# Patient Record
Sex: MALE | Race: WHITE | NOT HISPANIC OR LATINO
[De-identification: names, ages, dates, MRNs, and addresses within clinical notes are randomized per-mention and may not be internally consistent; named-entity substitution may affect disease eponyms.]

---

## 2017-11-15 ENCOUNTER — APPOINTMENT (OUTPATIENT)
Dept: HEART AND VASCULAR | Facility: CLINIC | Age: 67
End: 2017-11-15
Payer: COMMERCIAL

## 2017-11-15 VITALS
BODY MASS INDEX: 32.51 KG/M2 | WEIGHT: 240 LBS | HEART RATE: 80 BPM | SYSTOLIC BLOOD PRESSURE: 130 MMHG | DIASTOLIC BLOOD PRESSURE: 80 MMHG | HEIGHT: 72 IN

## 2017-11-15 DIAGNOSIS — M50.90 CERVICAL DISC DISORDER, UNSPECIFIED, UNSPECIFIED CERVICAL REGION: ICD-10-CM

## 2017-11-15 PROCEDURE — 93000 ELECTROCARDIOGRAM COMPLETE: CPT

## 2017-11-15 PROCEDURE — 93306 TTE W/DOPPLER COMPLETE: CPT

## 2017-11-15 PROCEDURE — 99214 OFFICE O/P EST MOD 30 MIN: CPT | Mod: 25

## 2017-11-15 RX ORDER — AMOXICILLIN AND CLAVULANATE POTASSIUM 875; 125 MG/1; MG/1
875-125 TABLET, COATED ORAL
Qty: 20 | Refills: 0 | Status: DISCONTINUED | COMMUNITY
Start: 2017-08-31

## 2017-11-15 RX ORDER — AMOXICILLIN AND CLAVULANATE POTASSIUM 500; 125 MG/1; MG/1
500-125 TABLET, FILM COATED ORAL
Qty: 14 | Refills: 0 | Status: DISCONTINUED | COMMUNITY
Start: 2017-06-30

## 2018-05-16 ENCOUNTER — APPOINTMENT (OUTPATIENT)
Dept: HEART AND VASCULAR | Facility: CLINIC | Age: 68
End: 2018-05-16
Payer: COMMERCIAL

## 2018-05-16 VITALS
BODY MASS INDEX: 32.55 KG/M2 | SYSTOLIC BLOOD PRESSURE: 124 MMHG | DIASTOLIC BLOOD PRESSURE: 80 MMHG | WEIGHT: 240 LBS | HEART RATE: 72 BPM

## 2018-05-16 PROCEDURE — 93000 ELECTROCARDIOGRAM COMPLETE: CPT

## 2018-05-16 PROCEDURE — 99214 OFFICE O/P EST MOD 30 MIN: CPT | Mod: 25

## 2018-05-16 RX ORDER — OMEPRAZOLE 20 MG/1
20 CAPSULE, DELAYED RELEASE ORAL
Qty: 90 | Refills: 0 | Status: DISCONTINUED | COMMUNITY
Start: 2018-01-25

## 2018-05-16 RX ORDER — AZITHROMYCIN 250 MG/1
250 TABLET, FILM COATED ORAL
Qty: 6 | Refills: 0 | Status: DISCONTINUED | COMMUNITY
Start: 2018-02-28

## 2018-05-16 RX ORDER — METHIMAZOLE 5 MG/1
5 TABLET ORAL
Qty: 90 | Refills: 0 | Status: DISCONTINUED | COMMUNITY
Start: 2017-12-22

## 2018-05-16 RX ORDER — CIPROFLOXACIN AND DEXAMETHASONE 3; 1 MG/ML; MG/ML
0.3-0.1 SUSPENSION/ DROPS AURICULAR (OTIC)
Qty: 7 | Refills: 0 | Status: DISCONTINUED | COMMUNITY
Start: 2018-04-06

## 2018-05-16 RX ORDER — GUAIFENESIN AND CODEINE PHOSPHATE 10; 100 MG/5ML; MG/5ML
100-10 SOLUTION ORAL
Qty: 140 | Refills: 0 | Status: DISCONTINUED | COMMUNITY
Start: 2018-02-02

## 2018-05-16 RX ORDER — BENZONATATE 100 MG/1
100 CAPSULE ORAL
Qty: 21 | Refills: 0 | Status: DISCONTINUED | COMMUNITY
Start: 2018-02-06

## 2018-05-16 RX ORDER — OSELTAMIVIR PHOSPHATE 75 MG/1
75 CAPSULE ORAL
Qty: 10 | Refills: 0 | Status: DISCONTINUED | COMMUNITY
Start: 2018-01-30

## 2018-05-16 RX ORDER — ERGOCALCIFEROL 1.25 MG/1
1.25 MG CAPSULE, LIQUID FILLED ORAL
Qty: 3 | Refills: 0 | Status: ACTIVE | COMMUNITY
Start: 2017-05-09

## 2018-05-16 RX ORDER — PREDNISONE 20 MG/1
20 TABLET ORAL
Qty: 4 | Refills: 0 | Status: DISCONTINUED | COMMUNITY
Start: 2018-01-30

## 2018-11-14 ENCOUNTER — APPOINTMENT (OUTPATIENT)
Dept: HEART AND VASCULAR | Facility: CLINIC | Age: 68
End: 2018-11-14
Payer: COMMERCIAL

## 2018-11-14 VITALS
BODY MASS INDEX: 32.01 KG/M2 | SYSTOLIC BLOOD PRESSURE: 110 MMHG | DIASTOLIC BLOOD PRESSURE: 64 MMHG | WEIGHT: 236 LBS | HEART RATE: 60 BPM

## 2018-11-14 DIAGNOSIS — I34.0 NONRHEUMATIC MITRAL (VALVE) INSUFFICIENCY: ICD-10-CM

## 2018-11-14 PROCEDURE — 93000 ELECTROCARDIOGRAM COMPLETE: CPT

## 2018-11-14 PROCEDURE — 99214 OFFICE O/P EST MOD 30 MIN: CPT | Mod: 25

## 2018-11-14 PROCEDURE — 93306 TTE W/DOPPLER COMPLETE: CPT

## 2018-11-14 RX ORDER — SIMVASTATIN 80 MG/1
80 TABLET, FILM COATED ORAL
Qty: 90 | Refills: 0 | Status: DISCONTINUED | COMMUNITY
Start: 2017-06-06 | End: 2018-11-14

## 2018-11-14 NOTE — REVIEW OF SYSTEMS
[Fever] : no fever [Headache] : headache [Recent Weight Gain (___ Lbs)] : no recent weight gain [Chills] : no chills [Feeling Fatigued] : feeling fatigued [Recent Weight Loss (___ Lbs)] : no recent weight loss [Blurry Vision] : no blurred vision [Seeing Double (Diplopia)] : no diplopia [Eye Pain] : no eye pain [Eyeglasses] : currently wearing eyeglasses [Eyesight Problems] : eyesight problems [Dry Eyes] : dryness of the eyes [Loss Of Hearing] : no hearing loss [Sore Throat] : no sore throat [Pain During Urination] : no dysuria [Nocturia] : nocturia [see HPI] : see HPI [Joint Pain] : joint pain [Joint Swelling] : no joint swelling [Joint Stiffness] : no joint stiffness [Muscle Cramps] : no muscle cramps [Limb Weakness (Paresis)] : no limb weakness [Lower Back Pain] : lower back pain [Upper Back Pain] : upper back pain [Dizziness] : no dizziness [Tremor] : no tremor was seen [Numbness (Hypesthesia)] : numbness [Convulsions] : no convulsions [Tingling (Paresthesia)] : no tingling [Confusion] : no confusion was observed [Memory Lapses Or Loss] : no memory lapses or loss [Depression] : depression [Anxiety] : anxiety [Under Stress] : under stress [Suicidal] : not suicidal [Negative] : Heme/Lymph

## 2018-11-14 NOTE — PHYSICAL EXAM
[General Appearance - Well Developed] : well developed [Normal Appearance] : normal appearance [Well Groomed] : well groomed [General Appearance - Well Nourished] : well nourished [No Deformities] : no deformities [General Appearance - In No Acute Distress] : no acute distress [Normal Conjunctiva] : the conjunctiva exhibited no abnormalities [Normal Oral Mucosa] : normal oral mucosa [No Oral Pallor] : no oral pallor [No Oral Cyanosis] : no oral cyanosis [Normal Jugular Venous A Waves Present] : normal jugular venous A waves present [Normal Jugular Venous V Waves Present] : normal jugular venous V waves present [No Jugular Venous Dunham A Waves] : no jugular venous dunham A waves [FreeTextEntry1] : carotids w/o bruits [] : no respiratory distress [Respiration, Rhythm And Depth] : normal respiratory rhythm and effort [Exaggerated Use Of Accessory Muscles For Inspiration] : no accessory muscle use [Auscultation Breath Sounds / Voice Sounds] : lungs were clear to auscultation bilaterally [Heart Rate And Rhythm] : heart rate and rhythm were normal [Heart Sounds] : normal S1 and S2 [Murmurs] : no murmurs present [Arterial Pulses Normal] : the arterial pulses were normal [Edema] : no peripheral edema present [Bowel Sounds] : normal bowel sounds [Abdomen Soft] : soft [Abdomen Tenderness] : non-tender [Abnormal Walk] : normal gait [Nail Clubbing] : no clubbing of the fingernails [Skin Color & Pigmentation] : normal skin color and pigmentation [Oriented To Time, Place, And Person] : oriented to person, place, and time

## 2018-11-14 NOTE — DISCUSSION/SUMMARY
[FreeTextEntry1] : EKG:NSR,WNL\par ECHO:normal LVEF,min TR\par cont diovan for hptn; zocor for lipids.\par reveiwed labs done Sept 2018:\par AL=692om%,LDL=85mg%

## 2018-11-14 NOTE — HISTORY OF PRESENT ILLNESS
[FreeTextEntry1] : using TENS instead of narcotics for pain. was feeling fatigued and dx with EBV- no cp,sob

## 2018-12-31 ENCOUNTER — APPOINTMENT (OUTPATIENT)
Dept: ENDOCRINOLOGY | Facility: CLINIC | Age: 68
End: 2018-12-31

## 2019-04-17 ENCOUNTER — APPOINTMENT (OUTPATIENT)
Dept: HEART AND VASCULAR | Facility: CLINIC | Age: 69
End: 2019-04-17
Payer: COMMERCIAL

## 2019-04-17 VITALS
WEIGHT: 235 LBS | SYSTOLIC BLOOD PRESSURE: 114 MMHG | DIASTOLIC BLOOD PRESSURE: 78 MMHG | BODY MASS INDEX: 31.87 KG/M2 | HEART RATE: 68 BPM

## 2019-04-17 DIAGNOSIS — R07.89 OTHER CHEST PAIN: ICD-10-CM

## 2019-04-17 PROCEDURE — 93000 ELECTROCARDIOGRAM COMPLETE: CPT

## 2019-04-17 PROCEDURE — 99214 OFFICE O/P EST MOD 30 MIN: CPT | Mod: 25

## 2019-04-17 NOTE — DISCUSSION/SUMMARY
[FreeTextEntry1] : EKG:NSR\par Feel CP probably not ischemic but will get pharmacologic nuc stress ( can't exercise due to L/S issues)\par cont Diovan for HPTN, ,Zocor for lipids. reviewed labs done feb 26,2019; TC= 171mg%,LE=232ch%,LDL=97mg%,HDL=97mg%\par meds renewed

## 2019-04-17 NOTE — REVIEW OF SYSTEMS
[Fever] : no fever [Headache] : headache [Recent Weight Gain (___ Lbs)] : no recent weight gain [Feeling Fatigued] : not feeling fatigued [Chills] : no chills [Blurry Vision] : no blurred vision [Recent Weight Loss (___ Lbs)] : no recent weight loss [Eye Pain] : no eye pain [Seeing Double (Diplopia)] : no diplopia [Dry Eyes] : dryness of the eyes [Eyesight Problems] : eyesight problems [Eyeglasses] : currently wearing eyeglasses [Loss Of Hearing] : no hearing loss [Sore Throat] : no sore throat [Chest  Pressure] : chest pressure [Abdominal Pain] : no abdominal pain [Heartburn] : heartburn [Nocturia] : nocturia [Pain During Urination] : no dysuria [see HPI] : see HPI [Joint Swelling] : no joint swelling [Joint Pain] : joint pain [Joint Stiffness] : no joint stiffness [Limb Weakness (Paresis)] : no limb weakness [Lower Back Pain] : lower back pain [Muscle Cramps] : no muscle cramps [Dizziness] : no dizziness [Upper Back Pain] : upper back pain [Numbness (Hypesthesia)] : numbness [Convulsions] : no convulsions [Tremor] : no tremor was seen [Memory Lapses Or Loss] : no memory lapses or loss [Tingling (Paresthesia)] : no tingling [Confusion] : no confusion was observed [Anxiety] : anxiety [Depression] : depression [Suicidal] : not suicidal [Under Stress] : under stress [Negative] : Heme/Lymph

## 2019-04-17 NOTE — HISTORY OF PRESENT ILLNESS
[FreeTextEntry1] : has L/S Sx and due for another injection. has cp periodically, lasts a few seconds, non exertional. No sob,palpitations. states has thyroid bx scheduled next week\par Diovan HCT changed to Diovan because of elevated Uric acid

## 2019-04-17 NOTE — PHYSICAL EXAM
[Normal Appearance] : normal appearance [General Appearance - Well Developed] : well developed [Well Groomed] : well groomed [No Deformities] : no deformities [General Appearance - Well Nourished] : well nourished [General Appearance - In No Acute Distress] : no acute distress [Normal Conjunctiva] : the conjunctiva exhibited no abnormalities [Normal Oral Mucosa] : normal oral mucosa [Normal Jugular Venous A Waves Present] : normal jugular venous A waves present [Normal Jugular Venous V Waves Present] : normal jugular venous V waves present [No Jugular Venous Dunham A Waves] : no jugular venous dunham A waves [] : no respiratory distress [Respiration, Rhythm And Depth] : normal respiratory rhythm and effort [Heart Rate And Rhythm] : heart rate and rhythm were normal [Auscultation Breath Sounds / Voice Sounds] : lungs were clear to auscultation bilaterally [Exaggerated Use Of Accessory Muscles For Inspiration] : no accessory muscle use [Arterial Pulses Normal] : the arterial pulses were normal [Heart Sounds] : normal S1 and S2 [Murmurs] : no murmurs present [Edema] : no peripheral edema present [Bowel Sounds] : normal bowel sounds [Abdomen Tenderness] : non-tender [Abnormal Walk] : normal gait [Abdomen Soft] : soft [FreeTextEntry1] : decreased C/S ROM; Back pain [Nail Clubbing] : no clubbing of the fingernails [Skin Color & Pigmentation] : normal skin color and pigmentation [Oriented To Time, Place, And Person] : oriented to person, place, and time

## 2019-08-07 ENCOUNTER — APPOINTMENT (OUTPATIENT)
Dept: HEART AND VASCULAR | Facility: CLINIC | Age: 69
End: 2019-08-07

## 2019-09-04 ENCOUNTER — APPOINTMENT (OUTPATIENT)
Dept: HEART AND VASCULAR | Facility: CLINIC | Age: 69
End: 2019-09-04
Payer: COMMERCIAL

## 2019-09-04 VITALS
DIASTOLIC BLOOD PRESSURE: 80 MMHG | WEIGHT: 237 LBS | BODY MASS INDEX: 32.14 KG/M2 | HEART RATE: 84 BPM | SYSTOLIC BLOOD PRESSURE: 110 MMHG

## 2019-09-04 PROCEDURE — 93000 ELECTROCARDIOGRAM COMPLETE: CPT

## 2019-09-04 PROCEDURE — 99214 OFFICE O/P EST MOD 30 MIN: CPT | Mod: 25

## 2019-09-04 NOTE — REVIEW OF SYSTEMS
[Fever] : no fever [Headache] : no headache [Recent Weight Gain (___ Lbs)] : no recent weight gain [Chills] : no chills [Feeling Fatigued] : not feeling fatigued [Recent Weight Loss (___ Lbs)] : no recent weight loss [Blurry Vision] : no blurred vision [Seeing Double (Diplopia)] : no diplopia [Eye Pain] : no eye pain [Eyeglasses] : currently wearing eyeglasses [Eyesight Problems] : eyesight problems [Dry Eyes] : dryness of the eyes [Loss Of Hearing] : no hearing loss [Sore Throat] : no sore throat [Chest  Pressure] : no chest pressure [Abdominal Pain] : no abdominal pain [Heartburn] : no heartburn [Pain During Urination] : no dysuria [Nocturia] : nocturia [see HPI] : see HPI [Joint Pain] : joint pain [Joint Swelling] : no joint swelling [Joint Stiffness] : no joint stiffness [Muscle Cramps] : no muscle cramps [Limb Weakness (Paresis)] : no limb weakness [Lower Back Pain] : lower back pain [Upper Back Pain] : upper back pain [Dizziness] : no dizziness [Tremor] : no tremor was seen [Numbness (Hypesthesia)] : numbness [Convulsions] : no convulsions [Tingling (Paresthesia)] : no tingling [Confusion] : no confusion was observed [Memory Lapses Or Loss] : no memory lapses or loss [Depression] : depression [Anxiety] : anxiety [Under Stress] : not under stress [Suicidal] : not suicidal [Negative] : Heme/Lymph

## 2019-09-04 NOTE — DISCUSSION/SUMMARY
[FreeTextEntry1] : EKG:NSR,WNL\par had labs in May- reviewed, lipids not done\par weight loss\par cont Zocor for lipids, Diovan for hptn

## 2019-09-04 NOTE — PHYSICAL EXAM
[General Appearance - Well Developed] : well developed [Normal Appearance] : normal appearance [Well Groomed] : well groomed [General Appearance - Well Nourished] : well nourished [No Deformities] : no deformities [General Appearance - In No Acute Distress] : no acute distress [Normal Conjunctiva] : the conjunctiva exhibited no abnormalities [Normal Oral Mucosa] : normal oral mucosa [Normal Jugular Venous A Waves Present] : normal jugular venous A waves present [Normal Jugular Venous V Waves Present] : normal jugular venous V waves present [No Jugular Venous Dunham A Waves] : no jugular venous dunham A waves [FreeTextEntry1] : carotids w/o bruits [] : no respiratory distress [Respiration, Rhythm And Depth] : normal respiratory rhythm and effort [Exaggerated Use Of Accessory Muscles For Inspiration] : no accessory muscle use [Auscultation Breath Sounds / Voice Sounds] : lungs were clear to auscultation bilaterally [Heart Rate And Rhythm] : heart rate and rhythm were normal [Heart Sounds] : normal S1 and S2 [Murmurs] : no murmurs present [Edema] : no peripheral edema present [Arterial Pulses Normal] : the arterial pulses were normal [Bowel Sounds] : normal bowel sounds [Abdomen Soft] : soft [Abnormal Walk] : normal gait [Abdomen Tenderness] : non-tender [Nail Clubbing] : no clubbing of the fingernails [Skin Color & Pigmentation] : normal skin color and pigmentation [Oriented To Time, Place, And Person] : oriented to person, place, and time

## 2019-12-04 ENCOUNTER — APPOINTMENT (OUTPATIENT)
Dept: HEART AND VASCULAR | Facility: CLINIC | Age: 69
End: 2019-12-04
Payer: COMMERCIAL

## 2019-12-04 VITALS
BODY MASS INDEX: 31.19 KG/M2 | DIASTOLIC BLOOD PRESSURE: 76 MMHG | HEART RATE: 64 BPM | SYSTOLIC BLOOD PRESSURE: 122 MMHG | WEIGHT: 230 LBS

## 2019-12-04 DIAGNOSIS — I36.1 NONRHEUMATIC TRICUSPID (VALVE) INSUFFICIENCY: ICD-10-CM

## 2019-12-04 PROCEDURE — 99214 OFFICE O/P EST MOD 30 MIN: CPT | Mod: 25

## 2019-12-04 PROCEDURE — 93306 TTE W/DOPPLER COMPLETE: CPT

## 2019-12-04 PROCEDURE — 93000 ELECTROCARDIOGRAM COMPLETE: CPT

## 2019-12-04 NOTE — DISCUSSION/SUMMARY
[FreeTextEntry1] : EKG:NSR,WNL\par ECHO:LVH,normal LVEF,trivial AR/TR\par reviewed labs done 10/9/2019:\par OH=266cb%,LB=552rx%,HDL=45mg%,zUO=701lw%\par would increase Zocor for better LDL lowering; cont valsartan for HPTN\par f/u labs with PCP

## 2019-12-04 NOTE — REVIEW OF SYSTEMS
[Fever] : no fever [Recent Weight Gain (___ Lbs)] : no recent weight gain [Headache] : no headache [Chills] : no chills [Feeling Fatigued] : not feeling fatigued [Blurry Vision] : no blurred vision [Recent Weight Loss (___ Lbs)] : recent [unfilled] ~Ulb weight loss [Eyeglasses] : currently wearing eyeglasses [Seeing Double (Diplopia)] : no diplopia [Eye Pain] : no eye pain [Dry Eyes] : dryness of the eyes [Eyesight Problems] : eyesight problems [Loss Of Hearing] : no hearing loss [Sore Throat] : no sore throat [Abdominal Pain] : no abdominal pain [Chest  Pressure] : no chest pressure [Pain During Urination] : no dysuria [Heartburn] : no heartburn [Nocturia] : nocturia [see HPI] : see HPI [Joint Swelling] : no joint swelling [Joint Pain] : joint pain [Joint Stiffness] : no joint stiffness [Muscle Cramps] : no muscle cramps [Limb Weakness (Paresis)] : no limb weakness [Lower Back Pain] : lower back pain [Upper Back Pain] : upper back pain [Dizziness] : no dizziness [Tremor] : a tremor was seen [Convulsions] : no convulsions [Tingling (Paresthesia)] : no tingling [Numbness (Hypesthesia)] : numbness [Memory Lapses Or Loss] : no memory lapses or loss [Confusion] : no confusion was observed [Depression] : depression [Anxiety] : anxiety [Under Stress] : not under stress [Suicidal] : not suicidal [Negative] : Endocrine

## 2019-12-04 NOTE — PHYSICAL EXAM
[General Appearance - Well Developed] : well developed [Well Groomed] : well groomed [Normal Appearance] : normal appearance [General Appearance - Well Nourished] : well nourished [No Deformities] : no deformities [General Appearance - In No Acute Distress] : no acute distress [Normal Conjunctiva] : the conjunctiva exhibited no abnormalities [Normal Oral Mucosa] : normal oral mucosa [Normal Jugular Venous A Waves Present] : normal jugular venous A waves present [Normal Jugular Venous V Waves Present] : normal jugular venous V waves present [No Jugular Venous Dunham A Waves] : no jugular venous dunham A waves [] : no respiratory distress [FreeTextEntry1] : carotids w/o bruits [Exaggerated Use Of Accessory Muscles For Inspiration] : no accessory muscle use [Respiration, Rhythm And Depth] : normal respiratory rhythm and effort [Auscultation Breath Sounds / Voice Sounds] : lungs were clear to auscultation bilaterally [Heart Rate And Rhythm] : heart rate and rhythm were normal [Murmurs] : no murmurs present [Heart Sounds] : normal S1 and S2 [Arterial Pulses Normal] : the arterial pulses were normal [Edema] : no peripheral edema present [Abdomen Soft] : soft [Bowel Sounds] : normal bowel sounds [Abdomen Tenderness] : non-tender [Abnormal Walk] : normal gait [Skin Color & Pigmentation] : normal skin color and pigmentation [Nail Clubbing] : no clubbing of the fingernails [Oriented To Time, Place, And Person] : oriented to person, place, and time

## 2020-06-01 ENCOUNTER — RX RENEWAL (OUTPATIENT)
Age: 70
End: 2020-06-01

## 2020-06-05 ENCOUNTER — APPOINTMENT (OUTPATIENT)
Dept: HEART AND VASCULAR | Facility: CLINIC | Age: 70
End: 2020-06-05
Payer: COMMERCIAL

## 2020-06-05 VITALS
DIASTOLIC BLOOD PRESSURE: 70 MMHG | BODY MASS INDEX: 31.87 KG/M2 | HEART RATE: 72 BPM | SYSTOLIC BLOOD PRESSURE: 130 MMHG | WEIGHT: 235 LBS

## 2020-06-05 PROCEDURE — 99214 OFFICE O/P EST MOD 30 MIN: CPT | Mod: 25

## 2020-06-05 PROCEDURE — 93000 ELECTROCARDIOGRAM COMPLETE: CPT

## 2020-06-05 NOTE — DISCUSSION/SUMMARY
[FreeTextEntry1] : EKG:NSR\par reviewed meds done Feb:\par PD=500um%,LDL=82mg%\par cont Diovan for hptn;Zocor for lipids

## 2020-06-05 NOTE — PHYSICAL EXAM
[General Appearance - Well Developed] : well developed [Normal Appearance] : normal appearance [Well Groomed] : well groomed [General Appearance - Well Nourished] : well nourished [No Deformities] : no deformities [General Appearance - In No Acute Distress] : no acute distress [Normal Conjunctiva] : the conjunctiva exhibited no abnormalities [Normal Oral Mucosa] : normal oral mucosa [Normal Jugular Venous A Waves Present] : normal jugular venous A waves present [Normal Jugular Venous V Waves Present] : normal jugular venous V waves present [FreeTextEntry1] : carotids w/o bruits [No Jugular Venous Dunham A Waves] : no jugular venous dunham A waves [] : no respiratory distress [Respiration, Rhythm And Depth] : normal respiratory rhythm and effort [Exaggerated Use Of Accessory Muscles For Inspiration] : no accessory muscle use [Auscultation Breath Sounds / Voice Sounds] : lungs were clear to auscultation bilaterally [Heart Sounds] : normal S1 and S2 [Heart Rate And Rhythm] : heart rate and rhythm were normal [Murmurs] : no murmurs present [Arterial Pulses Normal] : the arterial pulses were normal [Edema] : no peripheral edema present [Bowel Sounds] : normal bowel sounds [Abdomen Soft] : soft [Abdomen Tenderness] : non-tender [Abnormal Walk] : normal gait [Nail Clubbing] : no clubbing of the fingernails [Skin Color & Pigmentation] : normal skin color and pigmentation [Oriented To Time, Place, And Person] : oriented to person, place, and time

## 2020-07-30 ENCOUNTER — APPOINTMENT (OUTPATIENT)
Dept: ENDOCRINOLOGY | Facility: CLINIC | Age: 70
End: 2020-07-30
Payer: COMMERCIAL

## 2020-07-30 VITALS
BODY MASS INDEX: 34.02 KG/M2 | WEIGHT: 235 LBS | DIASTOLIC BLOOD PRESSURE: 87 MMHG | OXYGEN SATURATION: 96 % | HEIGHT: 69.5 IN | HEART RATE: 82 BPM | SYSTOLIC BLOOD PRESSURE: 154 MMHG

## 2020-07-30 DIAGNOSIS — G25.81 RESTLESS LEGS SYNDROME: ICD-10-CM

## 2020-07-30 DIAGNOSIS — Z82.49 FAMILY HISTORY OF ISCHEMIC HEART DISEASE AND OTHER DISEASES OF THE CIRCULATORY SYSTEM: ICD-10-CM

## 2020-07-30 DIAGNOSIS — Z86.39 PERSONAL HISTORY OF OTHER ENDOCRINE, NUTRITIONAL AND METABOLIC DISEASE: ICD-10-CM

## 2020-07-30 DIAGNOSIS — Z80.9 FAMILY HISTORY OF MALIGNANT NEOPLASM, UNSPECIFIED: ICD-10-CM

## 2020-07-30 DIAGNOSIS — R51 HEADACHE: ICD-10-CM

## 2020-07-30 DIAGNOSIS — Z86.79 PERSONAL HISTORY OF OTHER DISEASES OF THE CIRCULATORY SYSTEM: ICD-10-CM

## 2020-07-30 DIAGNOSIS — E07.9 DISORDER OF THYROID, UNSPECIFIED: ICD-10-CM

## 2020-07-30 PROCEDURE — 36415 COLL VENOUS BLD VENIPUNCTURE: CPT

## 2020-07-30 PROCEDURE — 99243 OFF/OP CNSLTJ NEW/EST LOW 30: CPT | Mod: 25

## 2020-07-30 RX ORDER — ALPRAZOLAM 0.5 MG/1
0.5 TABLET ORAL
Qty: 60 | Refills: 0 | Status: DISCONTINUED | COMMUNITY
Start: 2017-08-08 | End: 2020-07-30

## 2020-07-30 RX ORDER — ALPRAZOLAM 0.5 MG/1
0.5 TABLET ORAL
Refills: 0 | Status: ACTIVE | COMMUNITY

## 2020-07-30 RX ORDER — CITALOPRAM HYDROBROMIDE 20 MG/1
20 TABLET, FILM COATED ORAL
Qty: 90 | Refills: 0 | Status: DISCONTINUED | COMMUNITY
Start: 2017-10-09 | End: 2020-07-30

## 2020-07-30 RX ORDER — GABAPENTIN 900 MG/1
TABLET, FILM COATED ORAL
Refills: 0 | Status: DISCONTINUED | COMMUNITY
End: 2020-07-30

## 2020-07-30 RX ORDER — GABAPENTIN 600 MG/1
600 TABLET, COATED ORAL
Refills: 0 | Status: ACTIVE | COMMUNITY

## 2020-07-30 RX ORDER — CYCLOSPORINE 0.5 MG/ML
0.05 EMULSION OPHTHALMIC
Qty: 180 | Refills: 0 | Status: DISCONTINUED | COMMUNITY
Start: 2016-09-12 | End: 2020-07-30

## 2020-07-30 RX ORDER — CHLORZOXAZONE 750 MG/1
TABLET ORAL
Refills: 0 | Status: DISCONTINUED | COMMUNITY
End: 2020-07-30

## 2020-07-30 NOTE — PHYSICAL EXAM
[Well Nourished] : well nourished [Alert] : alert [No Acute Distress] : no acute distress [Well Developed] : well developed [EOMI] : extra ocular movement intact [Normal Sclera/Conjunctiva] : normal sclera/conjunctiva [Normal Oropharynx] : the oropharynx was normal [No Proptosis] : no proptosis [No Accessory Muscle Use] : no accessory muscle use [Well Healed Scar] : well healed scar [No Respiratory Distress] : no respiratory distress [Clear to Auscultation] : lungs were clear to auscultation bilaterally [Normal S1, S2] : normal S1 and S2 [No Edema] : no peripheral edema [Normal Rate] : heart rate was normal [Regular Rhythm] : with a regular rhythm [Normal Bowel Sounds] : normal bowel sounds [Pedal Pulses Normal] : the pedal pulses are present [Soft] : abdomen soft [Not Tender] : non-tender [Not Distended] : not distended [Normal Anterior Cervical Nodes] : no anterior cervical lymphadenopathy [No Spinal Tenderness] : no spinal tenderness [Normal Posterior Cervical Nodes] : no posterior cervical lymphadenopathy [Spine Straight] : spine straight [Normal Gait] : normal gait [No Stigmata of Cushings Syndrome] : no stigmata of Cushings Syndrome [No Rash] : no rash [Normal Strength/Tone] : muscle strength and tone were normal [Acanthosis Nigricans] : no acanthosis nigricans [Normal Reflexes] : deep tendon reflexes were 2+ and symmetric [No Tremors] : no tremors [Oriented x3] : oriented to person, place, and time [de-identified] : Right enlarged around 5 CM in size non-tender

## 2020-07-30 NOTE — CONSULT LETTER
[Dear  ___] : Dear  [unfilled], [( Thank you for referring [unfilled] for consultation for _____ )] : Thank you for referring [unfilled] for consultation for [unfilled] [Please see my note below.] : Please see my note below. [Consult Closing:] : Thank you very much for allowing me to participate in the care of this patient.  If you have any questions, please do not hesitate to contact me. [Sincerely,] : Sincerely, [FreeTextEntry3] : Katalina Masterson MD\par

## 2020-07-30 NOTE — REVIEW OF SYSTEMS
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

## 2020-07-30 NOTE — HISTORY OF PRESENT ILLNESS
[FreeTextEntry1] : Patient is a 71 y/o man who was found to have an enlarged thyroid last year. He was noting intermittent pain on the right side of his neck. A sonogram done 4/2019 shows multiple nodules with largest on right being 5 cm; had FNA that was [place] 2. Denies hoarseness or difficulty swallowing. He has no prior h/o of thyroid problems.

## 2020-07-30 NOTE — REASON FOR VISIT
[Consultation] : a consultation visit [Thyroid nodule/ MNG] : thyroid nodule/ MNG [FreeTextEntry2] : Dr Olivares

## 2020-09-02 ENCOUNTER — NON-APPOINTMENT (OUTPATIENT)
Age: 70
End: 2020-09-02

## 2020-09-03 ENCOUNTER — APPOINTMENT (OUTPATIENT)
Dept: ENDOCRINOLOGY | Facility: CLINIC | Age: 70
End: 2020-09-03
Payer: COMMERCIAL

## 2020-09-03 VITALS
BODY MASS INDEX: 34.02 KG/M2 | SYSTOLIC BLOOD PRESSURE: 150 MMHG | HEIGHT: 69.5 IN | DIASTOLIC BLOOD PRESSURE: 83 MMHG | OXYGEN SATURATION: 93 % | WEIGHT: 235 LBS | HEART RATE: 77 BPM

## 2020-09-03 LAB
CALCIT SERPL-MCNC: 13.6 PG/ML
SAVE SPECIMEN: NORMAL
T3 SERPL-MCNC: 95 NG/DL
T4 FREE SERPL-MCNC: 1 NG/DL
T4 SERPL-MCNC: 6.5 UG/DL
THYROGLOB AB SERPL-ACNC: <20 IU/ML
THYROPEROXIDASE AB SERPL IA-ACNC: 19.8 IU/ML
TSH RECEPTOR AB: <1.1 IU/L
TSH SERPL-ACNC: 0.18 UIU/ML
TSI ACT/NOR SER: <0.1 IU/L

## 2020-09-03 PROCEDURE — 36415 COLL VENOUS BLD VENIPUNCTURE: CPT

## 2020-09-03 PROCEDURE — 99213 OFFICE O/P EST LOW 20 MIN: CPT | Mod: 25

## 2020-09-03 NOTE — PHYSICAL EXAM
[Alert] : alert [Well Nourished] : well nourished [No Acute Distress] : no acute distress [Well Developed] : well developed [Normal Sclera/Conjunctiva] : normal sclera/conjunctiva [EOMI] : extra ocular movement intact [No Proptosis] : no proptosis [Normal Oropharynx] : the oropharynx was normal [Well Healed Scar] : well healed scar [No Respiratory Distress] : no respiratory distress [No Accessory Muscle Use] : no accessory muscle use [Clear to Auscultation] : lungs were clear to auscultation bilaterally [Normal S1, S2] : normal S1 and S2 [Normal Rate] : heart rate was normal [Regular Rhythm] : with a regular rhythm [No Edema] : no peripheral edema [Pedal Pulses Normal] : the pedal pulses are present [Normal Bowel Sounds] : normal bowel sounds [Not Tender] : non-tender [Not Distended] : not distended [Soft] : abdomen soft [No Spinal Tenderness] : no spinal tenderness [Normal Anterior Cervical Nodes] : no anterior cervical lymphadenopathy [Spine Straight] : spine straight [No Stigmata of Cushings Syndrome] : no stigmata of Cushings Syndrome [Normal Gait] : normal gait [Normal Strength/Tone] : muscle strength and tone were normal [No Rash] : no rash [Normal Reflexes] : deep tendon reflexes were 2+ and symmetric [No Tremors] : no tremors [Oriented x3] : oriented to person, place, and time [Acanthosis Nigricans] : no acanthosis nigricans [de-identified] : Right enlarged around 5 CM in size non-tender

## 2020-09-03 NOTE — ASSESSMENT
[FreeTextEntry1] : Patient with a MNG and recent labs shows a mildly suppressed TSH, repeated labs if same will do thyroid uptake and scan to r/o hot nodule.  His dominant nodule is quite large and with some pressure symptoms surgery can be considered. labs today check testosterone FSH/LH denies ED.

## 2020-09-03 NOTE — HISTORY OF PRESENT ILLNESS
[FreeTextEntry1] : Patient is a 71 y/o man who was found to have an enlarged thyroid last year. He was noting intermittent pain on the right side of his neck. A sonogram done 4/2019 shows multiple nodules with largest on right being 5 cm; had FNA that was [place] 2. Denies hoarseness or difficulty swallowing. He has no prior h/o of thyroid problems. TSH has been suppressed with normal T4 and T#, antibodies neg.

## 2020-09-10 LAB
CORTIS SERPL-MCNC: 8.9 UG/DL
FSH SERPL-MCNC: 9.8 IU/L
LH SERPL-ACNC: 4.7 IU/L
PROLACTIN SERPL-MCNC: 6.7 NG/ML
T3 SERPL-MCNC: 112 NG/DL
T4 FREE SERPL-MCNC: 1.2 NG/DL
T4 SERPL-MCNC: 6.8 UG/DL
TESTOST BND SERPL-MCNC: 3.8 PG/ML
TESTOST SERPL-MCNC: 445 NG/DL
TSH SERPL-ACNC: 0.17 UIU/ML

## 2020-09-23 ENCOUNTER — APPOINTMENT (OUTPATIENT)
Dept: NUCLEAR MEDICINE | Facility: HOSPITAL | Age: 70
End: 2020-09-23

## 2020-09-23 ENCOUNTER — RESULT REVIEW (OUTPATIENT)
Age: 70
End: 2020-09-23

## 2020-09-23 ENCOUNTER — OUTPATIENT (OUTPATIENT)
Dept: OUTPATIENT SERVICES | Facility: HOSPITAL | Age: 70
LOS: 1 days | End: 2020-09-23
Payer: COMMERCIAL

## 2020-09-23 DIAGNOSIS — E05.90 THYROTOXICOSIS, UNSPECIFIED WITHOUT THYROTOXIC CRISIS OR STORM: ICD-10-CM

## 2020-09-24 ENCOUNTER — APPOINTMENT (OUTPATIENT)
Dept: NUCLEAR MEDICINE | Facility: HOSPITAL | Age: 70
End: 2020-09-24

## 2020-09-24 ENCOUNTER — TRANSCRIPTION ENCOUNTER (OUTPATIENT)
Age: 70
End: 2020-09-24

## 2020-09-24 PROCEDURE — 78014 THYROID IMAGING W/BLOOD FLOW: CPT

## 2020-09-24 PROCEDURE — A9516: CPT

## 2020-10-01 ENCOUNTER — APPOINTMENT (OUTPATIENT)
Dept: ENDOCRINOLOGY | Facility: CLINIC | Age: 70
End: 2020-10-01
Payer: COMMERCIAL

## 2020-10-01 VITALS
OXYGEN SATURATION: 96 % | WEIGHT: 235 LBS | BODY MASS INDEX: 34.02 KG/M2 | SYSTOLIC BLOOD PRESSURE: 162 MMHG | HEART RATE: 79 BPM | DIASTOLIC BLOOD PRESSURE: 101 MMHG | HEIGHT: 69.5 IN

## 2020-10-01 VITALS — DIASTOLIC BLOOD PRESSURE: 70 MMHG | SYSTOLIC BLOOD PRESSURE: 118 MMHG

## 2020-10-01 PROCEDURE — 99213 OFFICE O/P EST LOW 20 MIN: CPT

## 2020-10-01 NOTE — HISTORY OF PRESENT ILLNESS
[FreeTextEntry1] : Patient is a 71 y/o man who was found to have an enlarged thyroid last year. He was noting intermittent pain on the right side of his neck. A sonogram done 4/2019 shows multiple nodules with largest on right being 5 cm; had FNA that was [place] 2. Denies hoarseness or difficulty swallowing. He has no prior h/o of thyroid problems. TSH has been suppressed with normal T4 and T3, antibodies neg. Recently did a thyroid uptake and scan.

## 2020-10-01 NOTE — PHYSICAL EXAM
[Alert] : alert [Well Nourished] : well nourished [No Acute Distress] : no acute distress [Well Developed] : well developed [Normal Sclera/Conjunctiva] : normal sclera/conjunctiva [EOMI] : extra ocular movement intact [No Proptosis] : no proptosis [Normal Oropharynx] : the oropharynx was normal [Well Healed Scar] : well healed scar [No Respiratory Distress] : no respiratory distress [No Accessory Muscle Use] : no accessory muscle use [Clear to Auscultation] : lungs were clear to auscultation bilaterally [Normal S1, S2] : normal S1 and S2 [Normal Rate] : heart rate was normal [Regular Rhythm] : with a regular rhythm [No Edema] : no peripheral edema [Pedal Pulses Normal] : the pedal pulses are present [Normal Bowel Sounds] : normal bowel sounds [Not Tender] : non-tender [Not Distended] : not distended [Soft] : abdomen soft [Normal Anterior Cervical Nodes] : no anterior cervical lymphadenopathy [No Spinal Tenderness] : no spinal tenderness [Spine Straight] : spine straight [No Stigmata of Cushings Syndrome] : no stigmata of Cushings Syndrome [Normal Gait] : normal gait [Normal Strength/Tone] : muscle strength and tone were normal [No Rash] : no rash [Acanthosis Nigricans] : no acanthosis nigricans [Normal Reflexes] : deep tendon reflexes were 2+ and symmetric [No Tremors] : no tremors [Oriented x3] : oriented to person, place, and time [de-identified] : Right enlarged around 5 CM in size slight discomfort

## 2020-10-01 NOTE — ASSESSMENT
[FreeTextEntry1] : Patient with a MNG and recent labs shows a mildly suppressed TSH, repeated labs the same .Thyroid uptake and scan shows heterogenous gland R>L to r/o  no hot nodule; uptake 24 %. .  His dominant nodule is quite large and with some pressure symptoms surgery can be considered. Will Consult with H and Neck surgeon.\par f/u 2 months to monitor TFTs will call if symptoms change. \par Repeat /70.

## 2020-11-03 ENCOUNTER — APPOINTMENT (OUTPATIENT)
Dept: OTOLARYNGOLOGY | Facility: CLINIC | Age: 70
End: 2020-11-03
Payer: COMMERCIAL

## 2020-11-03 VITALS
HEART RATE: 73 BPM | BODY MASS INDEX: 34.02 KG/M2 | HEIGHT: 69.5 IN | WEIGHT: 235 LBS | DIASTOLIC BLOOD PRESSURE: 73 MMHG | SYSTOLIC BLOOD PRESSURE: 117 MMHG

## 2020-11-03 PROCEDURE — 99204 OFFICE O/P NEW MOD 45 MIN: CPT | Mod: 25

## 2020-11-03 PROCEDURE — 31575 DIAGNOSTIC LARYNGOSCOPY: CPT

## 2020-11-03 PROCEDURE — 99072 ADDL SUPL MATRL&STAF TM PHE: CPT

## 2020-11-03 NOTE — PROCEDURE
[Dysphagia] : dysphagia not clearly evaluated by indirect laryngoscopy [None] : none [Flexible Endoscope] : examined with the flexible endoscope [Normal] : the nasopharynx was normal [de-identified] : c LAMINE.  nl bilat TVF motion

## 2020-11-03 NOTE — PHYSICAL EXAM
[Enlarged] : enlarged [Midline] : trachea located in midline position [Normal] : no rashes [FreeTextEntry1] : pt w R greater than L multinod goiter.  Pt w c/o neck spasm and pain on moving neck slightly for the fiberoptic eval.  pt w R ant and post neck scars related to prior c spine surgeries

## 2020-11-03 NOTE — HISTORY OF PRESENT ILLNESS
[de-identified] : Mr. Bay is a 69 yo male\par Referred by Dr. Masterson for multinodular thyroid.\par 4/4/2020 Sonogram showing multinodular nodule thyroid, isthmus with 3.9 cm solid isoechoic nodule\par Right mid to lower pole largest nodule measuring 5.0 cm solid isoechoic. Left lower pole 2 contiguous nodules, isoechoic measuring 2.9 cm\par 4/25/2020 FNA mid right lobe nodule.patho Bethesta category II .\par 8/21/2020 sonogram showing enlarged thyroid with stable large nodules\par 9/24/2020 Abnormal NM Thyroid imaging. compatible with multinodular goiter. Thyroid uptake of 24%\par Pt is subclinicaly hyperthyroid w sl depressed TSH. \par Pt notes occ swallow difficulty.   Pt does not lay flat due to c spine issues and fusion C#-7.  Had post approach in 2015 and R ant approac 1995.\par \par 8/21 study stable rel to 4/19.\par

## 2020-11-03 NOTE — CONSULT LETTER
[Dear  ___] : Dear  [unfilled], [Consult Letter:] : I had the pleasure of evaluating your patient, [unfilled]. [Please see my note below.] : Please see my note below. [Consult Closing:] : Thank you very much for allowing me to participate in the care of this patient.  If you have any questions, please do not hesitate to contact me. [Sincerely,] : Sincerely, [FreeTextEntry2] : Katalina Masterson MD ( [place], [place]) [FreeTextEntry3] : Donte Cabrera MD

## 2020-12-08 ENCOUNTER — APPOINTMENT (OUTPATIENT)
Dept: ENDOCRINOLOGY | Facility: CLINIC | Age: 70
End: 2020-12-08
Payer: COMMERCIAL

## 2020-12-08 VITALS
DIASTOLIC BLOOD PRESSURE: 83 MMHG | OXYGEN SATURATION: 95 % | BODY MASS INDEX: 34.17 KG/M2 | SYSTOLIC BLOOD PRESSURE: 120 MMHG | TEMPERATURE: 96.6 F | HEART RATE: 66 BPM | HEIGHT: 69.5 IN | WEIGHT: 236 LBS

## 2020-12-08 PROCEDURE — 99072 ADDL SUPL MATRL&STAF TM PHE: CPT

## 2020-12-08 PROCEDURE — 99213 OFFICE O/P EST LOW 20 MIN: CPT

## 2020-12-08 NOTE — ASSESSMENT
[FreeTextEntry1] : Patient with a MNG and recent labs shows a mildly suppressed TSH, repeated labs the same .Thyroid uptake and scan shows heterogenous gland R>L to r/o  no hot nodule; uptake 24 %. .  His dominant nodule is quite large and with some pressure symptoms however  surgery  felt to be risky due to spine problems.\par Will consider ASTUDILLO but in meantime start MTM 10 mg OD, side effects discussed. f/u 6 weeks.  [Methimazole Therapy/PTU Therapy] : Risks and benefits of methimazole therapy/PTU therapy were discussed with the patient, including rash, liver dysfunction, and agranulocytosis. Patient was instructed to call the office for flu-like symptoms (e.g. fever and sore-throat)

## 2020-12-08 NOTE — PHYSICAL EXAM
[Alert] : alert [Well Nourished] : well nourished [No Acute Distress] : no acute distress [Well Developed] : well developed [Normal Sclera/Conjunctiva] : normal sclera/conjunctiva [EOMI] : extra ocular movement intact [No Proptosis] : no proptosis [Normal Oropharynx] : the oropharynx was normal [Well Healed Scar] : well healed scar [No Respiratory Distress] : no respiratory distress [No Accessory Muscle Use] : no accessory muscle use [Clear to Auscultation] : lungs were clear to auscultation bilaterally [Normal S1, S2] : normal S1 and S2 [Normal Rate] : heart rate was normal [Regular Rhythm] : with a regular rhythm [No Edema] : no peripheral edema [Pedal Pulses Normal] : the pedal pulses are present [Normal Bowel Sounds] : normal bowel sounds [Not Tender] : non-tender [Not Distended] : not distended [Soft] : abdomen soft [Normal Anterior Cervical Nodes] : no anterior cervical lymphadenopathy [No Spinal Tenderness] : no spinal tenderness [Spine Straight] : spine straight [No Stigmata of Cushings Syndrome] : no stigmata of Cushings Syndrome [Normal Gait] : normal gait [Normal Strength/Tone] : muscle strength and tone were normal [No Rash] : no rash [Acanthosis Nigricans] : no acanthosis nigricans [Normal Reflexes] : deep tendon reflexes were 2+ and symmetric [No Tremors] : no tremors [Oriented x3] : oriented to person, place, and time [de-identified] : Right enlarged around 5 CM in size slight discomfort

## 2020-12-08 NOTE — HISTORY OF PRESENT ILLNESS
[FreeTextEntry1] : Patient is a 69 y/o man who was found to have an enlarged thyroid last year. He was noting intermittent pain on the right side of his neck. A sonogram done 4/2019 shows multiple nodules with largest on right being 5 cm; had FNA that was [place] 2. Denies hoarseness or difficulty swallowing. He has no prior h/o of thyroid problems. TSH has been suppressed with normal T4 and T3, antibodies neg. Recently did a thyroid uptake and scan RAIU 24 %. he did meet with the surgeon who felt given his cervical spine situation surgery may be risky in terms of positioning.. . he does note palpitations at times and difficulty sleeping and fatigue.

## 2020-12-17 ENCOUNTER — APPOINTMENT (OUTPATIENT)
Dept: HEART AND VASCULAR | Facility: CLINIC | Age: 70
End: 2020-12-17

## 2021-01-20 ENCOUNTER — APPOINTMENT (OUTPATIENT)
Dept: HEART AND VASCULAR | Facility: CLINIC | Age: 71
End: 2021-01-20
Payer: COMMERCIAL

## 2021-01-20 VITALS
BODY MASS INDEX: 33.48 KG/M2 | HEART RATE: 60 BPM | DIASTOLIC BLOOD PRESSURE: 82 MMHG | SYSTOLIC BLOOD PRESSURE: 122 MMHG | WEIGHT: 230 LBS

## 2021-01-20 DIAGNOSIS — I51.7 CARDIOMEGALY: ICD-10-CM

## 2021-01-20 DIAGNOSIS — I35.1 NONRHEUMATIC AORTIC (VALVE) INSUFFICIENCY: ICD-10-CM

## 2021-01-20 PROCEDURE — 99072 ADDL SUPL MATRL&STAF TM PHE: CPT

## 2021-01-20 PROCEDURE — 93000 ELECTROCARDIOGRAM COMPLETE: CPT

## 2021-01-20 PROCEDURE — 99214 OFFICE O/P EST MOD 30 MIN: CPT | Mod: 25

## 2021-01-20 PROCEDURE — 93306 TTE W/DOPPLER COMPLETE: CPT

## 2021-01-20 NOTE — PHYSICAL EXAM
[General Appearance - Well Developed] : well developed [Normal Appearance] : normal appearance [Well Groomed] : well groomed [General Appearance - Well Nourished] : well nourished [No Deformities] : no deformities [General Appearance - In No Acute Distress] : no acute distress [Normal Conjunctiva] : the conjunctiva exhibited no abnormalities [Normal Oral Mucosa] : normal oral mucosa [Normal Jugular Venous A Waves Present] : normal jugular venous A waves present [Normal Jugular Venous V Waves Present] : normal jugular venous V waves present [No Jugular Venous Dunham A Waves] : no jugular venous dunham A waves [] : no respiratory distress [Respiration, Rhythm And Depth] : normal respiratory rhythm and effort [Exaggerated Use Of Accessory Muscles For Inspiration] : no accessory muscle use [Auscultation Breath Sounds / Voice Sounds] : lungs were clear to auscultation bilaterally [Heart Rate And Rhythm] : heart rate and rhythm were normal [Murmurs] : no murmurs present [Heart Sounds] : normal S1 and S2 [Arterial Pulses Normal] : the arterial pulses were normal [Edema] : no peripheral edema present [Bowel Sounds] : normal bowel sounds [Abdomen Soft] : soft [Abdomen Tenderness] : non-tender [Abnormal Walk] : normal gait [Nail Clubbing] : no clubbing of the fingernails [Skin Color & Pigmentation] : normal skin color and pigmentation [Oriented To Time, Place, And Person] : oriented to person, place, and time [FreeTextEntry1] : carotids w/o bruits

## 2021-01-20 NOTE — REVIEW OF SYSTEMS
[Recent Weight Loss (___ Lbs)] : recent [unfilled] ~Ulb weight loss [Eyeglasses] : currently wearing eyeglasses [Eyesight Problems] : eyesight problems [Dry Eyes] : dryness of the eyes [Nocturia] : nocturia [see HPI] : see HPI [Joint Pain] : joint pain [Lower Back Pain] : lower back pain [Upper Back Pain] : upper back pain [Tremor] : a tremor was seen [Numbness (Hypesthesia)] : numbness [Negative] : Heme/Lymph [Fever] : no fever [Headache] : no headache [Recent Weight Gain (___ Lbs)] : no recent weight gain [Chills] : no chills [Feeling Fatigued] : not feeling fatigued [Blurry Vision] : no blurred vision [Seeing Double (Diplopia)] : no diplopia [Eye Pain] : no eye pain [Loss Of Hearing] : no hearing loss [Sore Throat] : no sore throat [Chest  Pressure] : no chest pressure [Abdominal Pain] : no abdominal pain [Heartburn] : no heartburn [Pain During Urination] : no dysuria [Joint Swelling] : no joint swelling [Joint Stiffness] : no joint stiffness [Muscle Cramps] : no muscle cramps [Limb Weakness (Paresis)] : no limb weakness [Dizziness] : no dizziness [Convulsions] : no convulsions [Tingling (Paresthesia)] : no tingling [Confusion] : no confusion was observed [Memory Lapses Or Loss] : no memory lapses or loss [Depression] : no depression [Anxiety] : no anxiety [Under Stress] : not under stress [Suicidal] : not suicidal

## 2021-01-20 NOTE — DISCUSSION/SUMMARY
[FreeTextEntry1] : EKG:NSR,WNL|NL\par ECHO:normal LVEf,mild AR,mild TR\par reviewed labs done Nov: LDL=92mG%\par cont Zocor for lipids; Diovan for HPTN\par discussed meds/DX\par RV 3-4 months for HPTN f/u

## 2021-05-11 ENCOUNTER — APPOINTMENT (OUTPATIENT)
Dept: ENDOCRINOLOGY | Facility: CLINIC | Age: 71
End: 2021-05-11
Payer: COMMERCIAL

## 2021-05-11 VITALS
TEMPERATURE: 97.1 F | OXYGEN SATURATION: 94 % | DIASTOLIC BLOOD PRESSURE: 85 MMHG | BODY MASS INDEX: 34.02 KG/M2 | HEIGHT: 69.5 IN | HEART RATE: 80 BPM | SYSTOLIC BLOOD PRESSURE: 151 MMHG | WEIGHT: 235 LBS

## 2021-05-11 PROCEDURE — 99072 ADDL SUPL MATRL&STAF TM PHE: CPT

## 2021-05-11 PROCEDURE — 99214 OFFICE O/P EST MOD 30 MIN: CPT | Mod: 25

## 2021-05-11 NOTE — ASSESSMENT
[FreeTextEntry1] : Patient with a MNG and recent labs shows a mildly suppressed TSH, repeated labs the same .Thyroid uptake and scan shows heterogenous gland R>L to r/o  no hot nodule; uptake 24 %. .Recent repeat nuclear scan at [facility] was 54 %.   His dominant nodule is quite large and with some pressure symptoms however  surgery  felt to be risky due to spine problems. Thyroidectomy scheduled this Friday. Discussed possible complications of surgery such as hypocalcemia. Will stop MTM after surgery. Given samples of Synthroid 112 to start after surgery per guidance from surgeon. \par f/u with me one mos. TFTs checked today.  [Methimazole Therapy/PTU Therapy] : Risks and benefits of methimazole therapy/PTU therapy were discussed with the patient, including rash, liver dysfunction, and agranulocytosis. Patient was instructed to call the office for flu-like symptoms (e.g. fever and sore-throat) [Levothyroxine] : The patient was instructed to take Levothyroxine on an empty stomach, separate from vitamins, and wait at least 30 minutes before eating

## 2021-05-11 NOTE — HISTORY OF PRESENT ILLNESS
[FreeTextEntry1] : Patient is a 71 y/o man who was found to have an enlarged thyroid last year. He was noting intermittent pain on the right side of his neck. A sonogram done 4/2019 shows multiple nodules with largest on right being 5 cm; had FNA that was [place] 2. Denies hoarseness or difficulty swallowing. He has no prior h/o of thyroid problems. TSH has been suppressed with normal T4 and T3, antibodies neg. Recently did a thyroid uptake and scan RAIU 24 %. he did meet with the surgeon who felt given his cervical spine situation surgery may be risky in terms of positioning.. . he does note palpitations at times and difficulty sleeping and fatigue. \par He saw a second surgeon Dr Brooks who repeated the thyroid scan and has suggested a total thyroidectomy. He has been on MTM 10 mg OD.

## 2021-05-11 NOTE — PHYSICAL EXAM
[Alert] : alert [Well Nourished] : well nourished [No Acute Distress] : no acute distress [Well Developed] : well developed [Normal Sclera/Conjunctiva] : normal sclera/conjunctiva [EOMI] : extra ocular movement intact [No Proptosis] : no proptosis [Normal Oropharynx] : the oropharynx was normal [Well Healed Scar] : well healed scar [No Respiratory Distress] : no respiratory distress [No Accessory Muscle Use] : no accessory muscle use [Clear to Auscultation] : lungs were clear to auscultation bilaterally [Normal S1, S2] : normal S1 and S2 [Normal Rate] : heart rate was normal [Regular Rhythm] : with a regular rhythm [No Edema] : no peripheral edema [Pedal Pulses Normal] : the pedal pulses are present [Normal Bowel Sounds] : normal bowel sounds [Not Tender] : non-tender [Not Distended] : not distended [Soft] : abdomen soft [Normal Anterior Cervical Nodes] : no anterior cervical lymphadenopathy [No Spinal Tenderness] : no spinal tenderness [Spine Straight] : spine straight [No Stigmata of Cushings Syndrome] : no stigmata of Cushings Syndrome [Normal Gait] : normal gait [Normal Strength/Tone] : muscle strength and tone were normal [No Rash] : no rash [Acanthosis Nigricans] : no acanthosis nigricans [Normal Reflexes] : deep tendon reflexes were 2+ and symmetric [No Tremors] : no tremors [Oriented x3] : oriented to person, place, and time [de-identified] : Right enlarged around 5 CM in size slight discomfort

## 2021-05-14 ENCOUNTER — NON-APPOINTMENT (OUTPATIENT)
Age: 71
End: 2021-05-14

## 2021-05-27 LAB
T3 SERPL-MCNC: 89 NG/DL
T4 FREE SERPL-MCNC: 0.6 NG/DL
T4 SERPL-MCNC: 3.9 UG/DL
TSH SERPL-ACNC: 2.73 UIU/ML

## 2021-06-08 LAB
ALBUMIN SERPL ELPH-MCNC: 4.8 G/DL
ALP BLD-CCNC: 115 U/L
ALT SERPL-CCNC: 18 U/L
ANION GAP SERPL CALC-SCNC: 9 MMOL/L
AST SERPL-CCNC: 20 U/L
BILIRUB SERPL-MCNC: 0.3 MG/DL
BUN SERPL-MCNC: 16 MG/DL
CALCIUM SERPL-MCNC: 10.1 MG/DL
CHLORIDE SERPL-SCNC: 97 MMOL/L
CO2 SERPL-SCNC: 28 MMOL/L
CREAT SERPL-MCNC: 0.84 MG/DL
GLUCOSE SERPL-MCNC: 78 MG/DL
POTASSIUM SERPL-SCNC: 4.9 MMOL/L
PROT SERPL-MCNC: 7.2 G/DL
SODIUM SERPL-SCNC: 134 MMOL/L
T4 FREE SERPL-MCNC: 0.9 NG/DL
T4 SERPL-MCNC: 5.4 UG/DL
TSH SERPL-ACNC: 3.67 UIU/ML

## 2021-06-08 RX ORDER — METHIMAZOLE 10 MG/1
10 TABLET ORAL
Qty: 30 | Refills: 5 | Status: COMPLETED | COMMUNITY
Start: 2020-12-08 | End: 2021-06-08

## 2021-06-15 ENCOUNTER — APPOINTMENT (OUTPATIENT)
Dept: ENDOCRINOLOGY | Facility: CLINIC | Age: 71
End: 2021-06-15
Payer: COMMERCIAL

## 2021-06-15 VITALS
TEMPERATURE: 96.6 F | WEIGHT: 235 LBS | OXYGEN SATURATION: 95 % | HEART RATE: 69 BPM | HEIGHT: 69.5 IN | BODY MASS INDEX: 34.02 KG/M2 | DIASTOLIC BLOOD PRESSURE: 61 MMHG | SYSTOLIC BLOOD PRESSURE: 126 MMHG

## 2021-06-15 DIAGNOSIS — R79.89 OTHER SPECIFIED ABNORMAL FINDINGS OF BLOOD CHEMISTRY: ICD-10-CM

## 2021-06-15 DIAGNOSIS — R13.12 DYSPHAGIA, OROPHARYNGEAL PHASE: ICD-10-CM

## 2021-06-15 LAB — T3 SERPL-MCNC: 65 NG/DL

## 2021-06-15 PROCEDURE — 99214 OFFICE O/P EST MOD 30 MIN: CPT

## 2021-06-15 NOTE — HISTORY OF PRESENT ILLNESS
[FreeTextEntry1] : Patient is a 69 y/o man who was found to have an enlarged thyroid last year. He was noting intermittent pain on the right side of his neck. A sonogram done 4/2019 shows multiple nodules with largest on right being 5 cm; had FNA that was [place] 2. Denies hoarseness or difficulty swallowing. He has no prior h/o of thyroid problems. TSH has been suppressed with normal T4 and T3, antibodies neg. Recently did a thyroid uptake and scan RAIU 24 %. he did meet with the surgeon who felt given his cervical spine situation surgery may be risky in terms of positioning.. . he does note palpitations at times and difficulty sleeping and fatigue. \par He saw a second surgeon Dr Brooks who repeated the thyroid scan and has suggested a total thyroidectomy. He had been on MTM 10 mg OD. now off one mos post thyroidectomy. Was over 100 G and benign. Now on Synthroid 112 ug but feels sluggish also c/o of tightness around his neck. No paresthesias on cramps; off calcium now,

## 2021-06-15 NOTE — ASSESSMENT
[FreeTextEntry1] : Patient with a MNG and recent labs shows a mildly suppressed TSH, repeated labs the same .Thyroid uptake and scan shows heterogenous gland R>L to r/o  no hot nodule; uptake 24 %. .Recent repeat nuclear scan at [facility] was 54 %.   His dominant nodule was quite large and with some pressure symptoms. He is now one mos post thyroidectomy.  On Synthroid 112 will finish then start 125 ug OD and do labs in 3 weeks. .Calcium normal off calcium supplements. Drinks a lot of water will decrease by one glass/day due to slightly low sodium.  \par  [Levothyroxine] : The patient was instructed to take Levothyroxine on an empty stomach, separate from vitamins, and wait at least 30 minutes before eating

## 2021-06-15 NOTE — PHYSICAL EXAM
[Alert] : alert [Well Nourished] : well nourished [No Acute Distress] : no acute distress [Well Developed] : well developed [Normal Sclera/Conjunctiva] : normal sclera/conjunctiva [EOMI] : extra ocular movement intact [No Proptosis] : no proptosis [Normal Oropharynx] : the oropharynx was normal [Well Healed Scar] : well healed scar [No Respiratory Distress] : no respiratory distress [No Accessory Muscle Use] : no accessory muscle use [Clear to Auscultation] : lungs were clear to auscultation bilaterally [Normal S1, S2] : normal S1 and S2 [Normal Rate] : heart rate was normal [Regular Rhythm] : with a regular rhythm [No Edema] : no peripheral edema [Pedal Pulses Normal] : the pedal pulses are present [Normal Bowel Sounds] : normal bowel sounds [Not Tender] : non-tender [Not Distended] : not distended [Soft] : abdomen soft [Normal Anterior Cervical Nodes] : no anterior cervical lymphadenopathy [No Spinal Tenderness] : no spinal tenderness [Spine Straight] : spine straight [No Stigmata of Cushings Syndrome] : no stigmata of Cushings Syndrome [Normal Gait] : normal gait [Normal Strength/Tone] : muscle strength and tone were normal [No Rash] : no rash [No Tremors] : no tremors [Normal Reflexes] : deep tendon reflexes were 2+ and symmetric [Oriented x3] : oriented to person, place, and time [Acanthosis Nigricans] : no acanthosis nigricans [de-identified] : non - tender no mass felt, feels tight  [de-identified] : - Marcus

## 2021-06-16 NOTE — REVIEW OF SYSTEMS
[Fever] : no fever [Headache] : no headache [Recent Weight Gain (___ Lbs)] : recent [unfilled] ~Ulb weight gain [Chills] : no chills [Feeling Fatigued] : not feeling fatigued [Recent Weight Loss (___ Lbs)] : no recent weight loss [Blurry Vision] : no blurred vision [Seeing Double (Diplopia)] : no diplopia [Eye Pain] : no eye pain [Eyeglasses] : currently wearing eyeglasses [Eyesight Problems] : eyesight problems [Dry Eyes] : dryness of the eyes [Loss Of Hearing] : no hearing loss [Sore Throat] : no sore throat [Chest  Pressure] : no chest pressure [Abdominal Pain] : no abdominal pain [Heartburn] : no heartburn [Pain During Urination] : no dysuria [Nocturia] : nocturia [see HPI] : see HPI [Joint Pain] : joint pain [Joint Swelling] : no joint swelling [Joint Stiffness] : no joint stiffness [Muscle Cramps] : no muscle cramps [Limb Weakness (Paresis)] : no limb weakness [Lower Back Pain] : lower back pain [Upper Back Pain] : upper back pain [Dizziness] : no dizziness [Tremor] : a tremor was seen [Numbness (Hypesthesia)] : numbness [Tingling (Paresthesia)] : no tingling never [Convulsions] : no convulsions [Confusion] : no confusion was observed [Memory Lapses Or Loss] : no memory lapses or loss [Depression] : no depression [Anxiety] : no anxiety [Suicidal] : not suicidal [Under Stress] : not under stress [Negative] : Endocrine

## 2021-06-23 ENCOUNTER — APPOINTMENT (OUTPATIENT)
Dept: HEART AND VASCULAR | Facility: CLINIC | Age: 71
End: 2021-06-23
Payer: COMMERCIAL

## 2021-06-23 VITALS
WEIGHT: 240 LBS | SYSTOLIC BLOOD PRESSURE: 122 MMHG | DIASTOLIC BLOOD PRESSURE: 84 MMHG | BODY MASS INDEX: 34.93 KG/M2 | HEART RATE: 64 BPM

## 2021-06-23 DIAGNOSIS — R00.2 PALPITATIONS: ICD-10-CM

## 2021-06-23 PROCEDURE — 99214 OFFICE O/P EST MOD 30 MIN: CPT | Mod: 25

## 2021-06-23 PROCEDURE — 93000 ELECTROCARDIOGRAM COMPLETE: CPT

## 2021-06-23 NOTE — DISCUSSION/SUMMARY
[FreeTextEntry1] : EKG:NSR\par continue Zocor for lipids; Diovan for hptn- rare palpitations- advised to call if progresses

## 2021-06-23 NOTE — REVIEW OF SYSTEMS
[Headache] : no headache [Weight Gain (___ Lbs)] : [unfilled] ~Ulb weight gain [Feeling Fatigued] : feeling fatigued [Weight Loss (___ Lbs)] : no recent weight loss [Hearing Loss] : no hearing loss [SOB] : no shortness of breath [Dyspnea on exertion] : not dyspnea during exertion [Chest Discomfort] : no chest discomfort [Lower Ext Edema] : no extremity edema [Leg Claudication] : no intermittent leg claudication [Palpitations] : palpitations [Orthopnea] : no orthopnea [Syncope] : no syncope [Hematuria] : no hematuria [Nocturia] : nocturia [Joint Pain] : joint pain [Negative] : Heme/Lymph [FreeTextEntry5] : see HPI [FreeTextEntry2] : equilibrium "off"

## 2021-07-22 LAB
ALBUMIN SERPL ELPH-MCNC: 4.7 G/DL
ALP BLD-CCNC: 112 U/L
ALT SERPL-CCNC: 19 U/L
ANION GAP SERPL CALC-SCNC: 11 MMOL/L
AST SERPL-CCNC: 18 U/L
BILIRUB SERPL-MCNC: <0.2 MG/DL
BUN SERPL-MCNC: 24 MG/DL
CALCIUM SERPL-MCNC: 9.9 MG/DL
CHLORIDE SERPL-SCNC: 95 MMOL/L
CO2 SERPL-SCNC: 28 MMOL/L
CREAT SERPL-MCNC: 0.85 MG/DL
GLUCOSE SERPL-MCNC: 95 MG/DL
POTASSIUM SERPL-SCNC: 5.1 MMOL/L
PROT SERPL-MCNC: 7.3 G/DL
SODIUM SERPL-SCNC: 135 MMOL/L
T4 FREE SERPL-MCNC: 0.9 NG/DL
T4 SERPL-MCNC: 4.7 UG/DL
TSH SERPL-ACNC: 3.59 UIU/ML

## 2021-10-08 ENCOUNTER — APPOINTMENT (OUTPATIENT)
Dept: HEART AND VASCULAR | Facility: CLINIC | Age: 71
End: 2021-10-08
Payer: COMMERCIAL

## 2021-10-08 VITALS
BODY MASS INDEX: 34.93 KG/M2 | SYSTOLIC BLOOD PRESSURE: 140 MMHG | DIASTOLIC BLOOD PRESSURE: 80 MMHG | WEIGHT: 240 LBS | HEART RATE: 88 BPM

## 2021-10-08 VITALS — SYSTOLIC BLOOD PRESSURE: 120 MMHG | DIASTOLIC BLOOD PRESSURE: 70 MMHG

## 2021-10-08 PROCEDURE — 99214 OFFICE O/P EST MOD 30 MIN: CPT | Mod: 25

## 2021-10-08 PROCEDURE — 93000 ELECTROCARDIOGRAM COMPLETE: CPT | Mod: NC

## 2021-10-08 NOTE — REVIEW OF SYSTEMS
[Headache] : no headache [Weight Gain (___ Lbs)] : [unfilled] ~Ulb weight gain [Feeling Fatigued] : not feeling fatigued [Weight Loss (___ Lbs)] : no recent weight loss [Hearing Loss] : no hearing loss [SOB] : no shortness of breath [Dyspnea on exertion] : not dyspnea during exertion [Chest Discomfort] : no chest discomfort [Lower Ext Edema] : no extremity edema [Leg Claudication] : no intermittent leg claudication [Palpitations] : palpitations [Orthopnea] : no orthopnea [Syncope] : no syncope [Hematuria] : no hematuria [Nocturia] : nocturia [Joint Pain] : joint pain [Negative] : Heme/Lymph [FreeTextEntry5] : see HPI [FreeTextEntry2] : equilibrium "off"

## 2021-10-08 NOTE — DISCUSSION/SUMMARY
[FreeTextEntry1] : EKG:NSR,PVC\par continue Zocor for lipids;valsartan for HPTN. will not treat PVc at this time as minimally symptomatic- he will get labs next week and needs TFT's( as PVC may be due to elevated thyroid)> Normal LVEF on echocardiogram done Jan 2021.\par His cardiac status is stable enough to permit Lumbar fusion

## 2021-10-08 NOTE — HISTORY OF PRESENT ILLNESS
[FreeTextEntry1] : for L3 fusion at end of month- no cp,sob,very rare palpitations- no significant caffeine

## 2021-10-08 NOTE — PHYSICAL EXAM
[Well Developed] : well developed [Well Nourished] : well nourished [No Acute Distress] : no acute distress [Normal Conjunctiva] : normal conjunctiva [Normal Venous Pressure] : normal venous pressure [No Carotid Bruit] : no carotid bruit [Normal S1, S2] : normal S1, S2 [No Murmur] : no murmur [No Rub] : no rub [No Gallop] : no gallop [Clear Lung Fields] : clear lung fields [Good Air Entry] : good air entry [No Respiratory Distress] : no respiratory distress  [Soft] : abdomen soft [Non Tender] : non-tender [Normal Bowel Sounds] : normal bowel sounds [No Edema] : no edema [No Cyanosis] : no cyanosis [No Clubbing] : no clubbing [No Varicosities] : no varicosities [No Rash] : no rash [Moves all extremities] : moves all extremities [Normal Speech] : normal speech [Alert and Oriented] : alert and oriented [Normal memory] : normal memory [de-identified] : using cane

## 2021-10-11 ENCOUNTER — APPOINTMENT (OUTPATIENT)
Dept: ENDOCRINOLOGY | Facility: CLINIC | Age: 71
End: 2021-10-11
Payer: COMMERCIAL

## 2021-10-11 VITALS
WEIGHT: 240 LBS | HEART RATE: 71 BPM | HEIGHT: 69 IN | SYSTOLIC BLOOD PRESSURE: 115 MMHG | OXYGEN SATURATION: 94 % | BODY MASS INDEX: 35.55 KG/M2 | DIASTOLIC BLOOD PRESSURE: 77 MMHG

## 2021-10-11 PROCEDURE — 99214 OFFICE O/P EST MOD 30 MIN: CPT | Mod: 25

## 2021-10-11 NOTE — HISTORY OF PRESENT ILLNESS
[FreeTextEntry1] : Patient is a 72 y/o man who was found to have an enlarged thyroid last year. He was noting intermittent pain on the right side of his neck. A sonogram done 4/2019 shows multiple nodules with largest on right being 5 cm; had FNA that was [place] 2. Denies hoarseness or difficulty swallowing. He has no prior h/o of thyroid problems. TSH has been suppressed with normal T4 and T3, antibodies neg. Recently did a thyroid uptake and scan RAIU 24 %. he did meet with the surgeon who felt given his cervical spine situation surgery may be risky in terms of positioning.. . he does note palpitations at times and difficulty sleeping and fatigue. \par He saw a second surgeon Dr Brooks who repeated the thyroid scan and has suggested a total thyroidectomy. He had been on MTM 10 mg OD. now off one mos post thyroidectomy. Was over 100 G and benign. Now on Synthroid 125 ug  which is an increase but feels sluggish also c/o of tightness around his neck. No paresthesias on cramps; off calcium now, \par Having surgery in 2 weeks for a spine problem after a recent fall over a ladder.

## 2021-10-11 NOTE — ASSESSMENT
[FreeTextEntry1] : Patient with a MNG and recent labs shows a mildly suppressed TSH, repeated labs the same .Thyroid uptake and scan shows heterogenous gland R>L to r/o  no hot nodule; uptake 24 %. .Recent repeat nuclear scan at [facility] was 54 %.   His dominant nodule was quite large and with some pressure symptoms. He is now one mos post thyroidectomy.  On Synthroid 125  .Calcium normal off calcium supplements. Drinks a lot of water so was told to  decrease by one glass/day due to slightly low sodium.  Check labs today, \par Does find it hard to lose weight. \par  [Levothyroxine] : The patient was instructed to take Levothyroxine on an empty stomach, separate from vitamins, and wait at least 30 minutes before eating

## 2021-10-11 NOTE — HISTORY OF PRESENT ILLNESS
[FreeTextEntry1] : Patient is a 72 y/o man who was found to have an enlarged thyroid last year. He was noting intermittent pain on the right side of his neck. A sonogram done 4/2019 shows multiple nodules with largest on right being 5 cm; had FNA that was [place] 2. Denies hoarseness or difficulty swallowing. He has no prior h/o of thyroid problems. TSH has been suppressed with normal T4 and T3, antibodies neg. Recently did a thyroid uptake and scan RAIU 24 %. he did meet with the surgeon who felt given his cervical spine situation surgery may be risky in terms of positioning.. . he does note palpitations at times and difficulty sleeping and fatigue. \par He saw a second surgeon Dr Brooks who repeated the thyroid scan and has suggested a total thyroidectomy. He had been on MTM 10 mg OD. now off one mos post thyroidectomy. Was over 100 G and benign. Now on Synthroid 125 ug  which is an increase but feels sluggish also c/o of tightness around his neck. No paresthesias on cramps; off calcium now, \par Having surgery in 2 weeks for a spine problem after a recent fall over a ladder.

## 2021-10-11 NOTE — ASSESSMENT
[FreeTextEntry1] : Patient with a MNG and recent labs shows a mildly suppressed TSH, repeated labs the same .Thyroid uptake and scan shows heterogenous gland R>L to r/o  no hot nodule; uptake 24 %. .Recent repeat nuclear scan at [place] [facility] was 54 %.   His dominant nodule was quite large and with some pressure symptoms. He is now one mos post thyroidectomy.  On Synthroid 125  .Calcium normal off calcium supplements. Drinks a lot of water so was told to  decrease by one glass/day due to slightly low sodium.  Check labs today, \par Does find it hard to lose weight. \par  [Levothyroxine] : The patient was instructed to take Levothyroxine on an empty stomach, separate from vitamins, and wait at least 30 minutes before eating

## 2021-10-11 NOTE — PHYSICAL EXAM
[Alert] : alert [Well Nourished] : well nourished [No Acute Distress] : no acute distress [Well Developed] : well developed [Normal Sclera/Conjunctiva] : normal sclera/conjunctiva [EOMI] : extra ocular movement intact [No Proptosis] : no proptosis [Normal Oropharynx] : the oropharynx was normal [Well Healed Scar] : well healed scar [No Respiratory Distress] : no respiratory distress [No Accessory Muscle Use] : no accessory muscle use [Clear to Auscultation] : lungs were clear to auscultation bilaterally [Normal S1, S2] : normal S1 and S2 [Normal Rate] : heart rate was normal [Regular Rhythm] : with a regular rhythm [No Edema] : no peripheral edema [Pedal Pulses Normal] : the pedal pulses are present [Normal Bowel Sounds] : normal bowel sounds [Not Tender] : non-tender [Not Distended] : not distended [Soft] : abdomen soft [Normal Anterior Cervical Nodes] : no anterior cervical lymphadenopathy [No Spinal Tenderness] : no spinal tenderness [Spine Straight] : spine straight [No Stigmata of Cushings Syndrome] : no stigmata of Cushings Syndrome [Normal Gait] : normal gait [Normal Strength/Tone] : muscle strength and tone were normal [No Rash] : no rash [Acanthosis Nigricans] : no acanthosis nigricans [Normal Reflexes] : deep tendon reflexes were 2+ and symmetric [No Tremors] : no tremors [Oriented x3] : oriented to person, place, and time [de-identified] : non - tender no mass felt, feels tight  [de-identified] : - Marcus

## 2021-10-11 NOTE — PHYSICAL EXAM
[Alert] : alert [Well Nourished] : well nourished [No Acute Distress] : no acute distress [Well Developed] : well developed [Normal Sclera/Conjunctiva] : normal sclera/conjunctiva [EOMI] : extra ocular movement intact [No Proptosis] : no proptosis [Normal Oropharynx] : the oropharynx was normal [Well Healed Scar] : well healed scar [No Respiratory Distress] : no respiratory distress [No Accessory Muscle Use] : no accessory muscle use [Clear to Auscultation] : lungs were clear to auscultation bilaterally [Normal S1, S2] : normal S1 and S2 [Normal Rate] : heart rate was normal [Regular Rhythm] : with a regular rhythm [No Edema] : no peripheral edema [Pedal Pulses Normal] : the pedal pulses are present [Normal Bowel Sounds] : normal bowel sounds [Not Tender] : non-tender [Not Distended] : not distended [Soft] : abdomen soft [Normal Anterior Cervical Nodes] : no anterior cervical lymphadenopathy [No Spinal Tenderness] : no spinal tenderness [Spine Straight] : spine straight [No Stigmata of Cushings Syndrome] : no stigmata of Cushings Syndrome [Normal Gait] : normal gait [Normal Strength/Tone] : muscle strength and tone were normal [No Rash] : no rash [Acanthosis Nigricans] : no acanthosis nigricans [Normal Reflexes] : deep tendon reflexes were 2+ and symmetric [No Tremors] : no tremors [Oriented x3] : oriented to person, place, and time [de-identified] : non - tender no mass felt, feels tight  [de-identified] : - Marcus

## 2021-10-19 LAB
ALBUMIN SERPL ELPH-MCNC: 4.7 G/DL
ALP BLD-CCNC: 117 U/L
ALT SERPL-CCNC: 21 U/L
ANION GAP SERPL CALC-SCNC: 17 MMOL/L
AST SERPL-CCNC: 22 U/L
BILIRUB SERPL-MCNC: 0.3 MG/DL
BUN SERPL-MCNC: 20 MG/DL
CALCIUM SERPL-MCNC: 9.5 MG/DL
CHLORIDE SERPL-SCNC: 93 MMOL/L
CO2 SERPL-SCNC: 24 MMOL/L
CREAT SERPL-MCNC: 0.91 MG/DL
GLUCOSE SERPL-MCNC: 97 MG/DL
POTASSIUM SERPL-SCNC: 5.1 MMOL/L
PROT SERPL-MCNC: 7.4 G/DL
SODIUM SERPL-SCNC: 135 MMOL/L
T4 FREE SERPL-MCNC: 1.1 NG/DL
T4 SERPL-MCNC: 6.1 UG/DL
TSH SERPL-ACNC: 1.81 UIU/ML

## 2021-11-16 ENCOUNTER — APPOINTMENT (OUTPATIENT)
Dept: HEART AND VASCULAR | Facility: CLINIC | Age: 71
End: 2021-11-16

## 2022-01-17 ENCOUNTER — APPOINTMENT (OUTPATIENT)
Dept: HEART AND VASCULAR | Facility: CLINIC | Age: 72
End: 2022-01-17
Payer: COMMERCIAL

## 2022-01-17 PROCEDURE — 99214 OFFICE O/P EST MOD 30 MIN: CPT | Mod: 25

## 2022-01-17 PROCEDURE — 93000 ELECTROCARDIOGRAM COMPLETE: CPT

## 2022-01-17 RX ORDER — IBUPROFEN 600 MG/1
600 TABLET, FILM COATED ORAL
Qty: 20 | Refills: 0 | Status: DISCONTINUED | COMMUNITY
Start: 2021-08-17

## 2022-01-17 RX ORDER — DONEPEZIL HYDROCHLORIDE 10 MG/1
10 TABLET ORAL
Qty: 90 | Refills: 0 | Status: DISCONTINUED | COMMUNITY
Start: 2021-04-14

## 2022-01-17 RX ORDER — ONDANSETRON 4 MG/1
4 TABLET ORAL
Qty: 20 | Refills: 0 | Status: DISCONTINUED | COMMUNITY
Start: 2021-10-15

## 2022-01-17 RX ORDER — AMOXICILLIN 500 MG/1
500 CAPSULE ORAL
Qty: 14 | Refills: 0 | Status: DISCONTINUED | COMMUNITY
Start: 2021-10-13

## 2022-01-17 RX ORDER — DOCUSATE SODIUM 100 MG/1
100 CAPSULE, LIQUID FILLED ORAL
Qty: 45 | Refills: 0 | Status: DISCONTINUED | COMMUNITY
Start: 2021-10-30

## 2022-01-17 RX ORDER — METHYLPREDNISOLONE 4 MG/1
4 TABLET ORAL
Qty: 21 | Refills: 0 | Status: DISCONTINUED | COMMUNITY
Start: 2021-07-28

## 2022-01-17 RX ORDER — CARBAMAZEPINE 200 MG/1
200 TABLET ORAL
Qty: 180 | Refills: 0 | Status: DISCONTINUED | COMMUNITY
Start: 2021-06-16

## 2022-01-17 RX ORDER — OFLOXACIN 3 MG/ML
0.3 SOLUTION/ DROPS OPHTHALMIC
Qty: 5 | Refills: 0 | Status: DISCONTINUED | COMMUNITY
Start: 2021-07-15

## 2022-01-17 RX ORDER — CYCLOBENZAPRINE HYDROCHLORIDE 10 MG/1
10 TABLET, FILM COATED ORAL
Qty: 20 | Refills: 0 | Status: DISCONTINUED | COMMUNITY
Start: 2021-08-17

## 2022-01-17 RX ORDER — SIMVASTATIN 40 MG/1
40 TABLET, FILM COATED ORAL
Qty: 135 | Refills: 0 | Status: DISCONTINUED | COMMUNITY
Start: 2018-01-23 | End: 2022-01-17

## 2022-01-17 RX ORDER — PREDNISOLONE ACETATE 10 MG/ML
1 SUSPENSION/ DROPS OPHTHALMIC
Qty: 5 | Refills: 0 | Status: DISCONTINUED | COMMUNITY
Start: 2021-07-15

## 2022-01-17 RX ORDER — OXYCODONE AND ACETAMINOPHEN 5; 325 MG/1; MG/1
5-325 TABLET ORAL
Qty: 28 | Refills: 0 | Status: DISCONTINUED | COMMUNITY
Start: 2021-12-02

## 2022-01-17 NOTE — PHYSICAL EXAM
[Well Developed] : well developed [Well Nourished] : well nourished [No Acute Distress] : no acute distress [Normal Conjunctiva] : normal conjunctiva [Normal Venous Pressure] : normal venous pressure [No Carotid Bruit] : no carotid bruit [Normal S1, S2] : normal S1, S2 [No Murmur] : no murmur [No Rub] : no rub [No Gallop] : no gallop [Clear Lung Fields] : clear lung fields [Good Air Entry] : good air entry [No Respiratory Distress] : no respiratory distress  [Soft] : abdomen soft [Non Tender] : non-tender [Normal Bowel Sounds] : normal bowel sounds [No Edema] : no edema [No Cyanosis] : no cyanosis [No Clubbing] : no clubbing [No Varicosities] : no varicosities [No Rash] : no rash [Moves all extremities] : moves all extremities [Normal Speech] : normal speech [Alert and Oriented] : alert and oriented [Normal memory] : normal memory [de-identified] : O/W [de-identified] : using cane

## 2022-01-17 NOTE — DISCUSSION/SUMMARY
[FreeTextEntry1] : EKG:NSR,RBBB(new)\par reviewed recent labs :[id number]%\par would get pharmacologic stress ( difficulty walking)\par continue Diovan for HPTN change Zocor to Crestor for better lDL control

## 2022-02-07 ENCOUNTER — APPOINTMENT (OUTPATIENT)
Dept: ENDOCRINOLOGY | Facility: CLINIC | Age: 72
End: 2022-02-07

## 2022-02-08 ENCOUNTER — APPOINTMENT (OUTPATIENT)
Dept: ENDOCRINOLOGY | Facility: CLINIC | Age: 72
End: 2022-02-08
Payer: COMMERCIAL

## 2022-02-08 VITALS
BODY MASS INDEX: 37.18 KG/M2 | WEIGHT: 251 LBS | OXYGEN SATURATION: 96 % | DIASTOLIC BLOOD PRESSURE: 83 MMHG | SYSTOLIC BLOOD PRESSURE: 150 MMHG | HEIGHT: 69 IN | HEART RATE: 7 BPM

## 2022-02-08 DIAGNOSIS — E04.2 NONTOXIC MULTINODULAR GOITER: ICD-10-CM

## 2022-02-08 PROCEDURE — 99214 OFFICE O/P EST MOD 30 MIN: CPT

## 2022-02-08 NOTE — PHYSICAL EXAM
[Alert] : alert [Well Nourished] : well nourished [No Acute Distress] : no acute distress [Well Developed] : well developed [Normal Sclera/Conjunctiva] : normal sclera/conjunctiva [EOMI] : extra ocular movement intact [No Proptosis] : no proptosis [Normal Oropharynx] : the oropharynx was normal [Well Healed Scar] : well healed scar [No Respiratory Distress] : no respiratory distress [No Accessory Muscle Use] : no accessory muscle use [Clear to Auscultation] : lungs were clear to auscultation bilaterally [Normal S1, S2] : normal S1 and S2 [Normal Rate] : heart rate was normal [Regular Rhythm] : with a regular rhythm [No Edema] : no peripheral edema [Pedal Pulses Normal] : the pedal pulses are present [Normal Bowel Sounds] : normal bowel sounds [Not Tender] : non-tender [Not Distended] : not distended [Soft] : abdomen soft [Normal Anterior Cervical Nodes] : no anterior cervical lymphadenopathy [No Spinal Tenderness] : no spinal tenderness [Spine Straight] : spine straight [No Stigmata of Cushings Syndrome] : no stigmata of Cushings Syndrome [Normal Gait] : normal gait [Normal Strength/Tone] : muscle strength and tone were normal [No Rash] : no rash [Acanthosis Nigricans] : no acanthosis nigricans [Normal Reflexes] : deep tendon reflexes were 2+ and symmetric [No Tremors] : no tremors [Oriented x3] : oriented to person, place, and time [de-identified] : non - tender no mass felt, feels tight  [de-identified] : - Marcus

## 2022-02-08 NOTE — HISTORY OF PRESENT ILLNESS
[FreeTextEntry1] : Patient is a 70 y/o man who was found to have an enlarged thyroid last year. He was noting intermittent pain on the right side of his neck. A sonogram done 4/2019 shows multiple nodules with largest on right being 5 cm; had FNA that was [place] 2. Denies hoarseness or difficulty swallowing. He has no prior h/o of thyroid problems. TSH has been suppressed with normal T4 and T3, antibodies neg. Recently did a thyroid uptake and scan RAIU 24 %. he did meet with the surgeon who felt given his cervical spine situation surgery may be risky in terms of positioning.. . he does note palpitations at times and difficulty sleeping and fatigue. \par He saw a second surgeon Dr Brooks who repeated the thyroid scan and has suggested a total thyroidectomy. He had been on MTM 10 mg OD. now off one mos post thyroidectomy. Was over 100 G and benign. Now on Synthroid 137 ug  which is an increase but feels sluggish also c/o of tightness around his neck. No paresthesias on cramps; off calcium now, \par Had surgery for a spine problem after a recent fall over a ladder. Now getting PT.

## 2022-02-08 NOTE — ASSESSMENT
[FreeTextEntry1] : Patient with a MNG and recent labs shows a mildly suppressed TSH, repeated labs the same .Thyroid uptake and scan shows heterogenous gland R>L to r/o  no hot nodule; uptake 24 %. .Recent repeat nuclear scan at [facility] was 54 %.   His dominant nodule was quite large and with some pressure symptoms. He is 6 mos post thyroidectomy.  On Synthroid 137 recent TFTs nl.   .Calcium off calcium supplements recently was slightly elevated . Drinks a lot of water so was told to  decrease by one glass/day due to slightly low sodium. \par to f/u in 3 mos with new labs and year baseline  sonogram post  thyroidectomy. \par before leaving he said saw TMJ specialist for pain above upper right teeth toward paranasal area. Recommend showing dentist and see ENT. TMJ specialist felt it wasn’t TMJ.  [Levothyroxine] : The patient was instructed to take Levothyroxine on an empty stomach, separate from vitamins, and wait at least 30 minutes before eating

## 2022-02-25 ENCOUNTER — APPOINTMENT (OUTPATIENT)
Dept: HEART AND VASCULAR | Facility: CLINIC | Age: 72
End: 2022-02-25
Payer: COMMERCIAL

## 2022-02-25 PROCEDURE — 93306 TTE W/DOPPLER COMPLETE: CPT

## 2022-05-02 LAB
25(OH)D3 SERPL-MCNC: 28.2 NG/ML
ALBUMIN SERPL ELPH-MCNC: 4.7 G/DL
ALP BLD-CCNC: 142 U/L
ALT SERPL-CCNC: 25 U/L
ANION GAP SERPL CALC-SCNC: 10 MMOL/L
AST SERPL-CCNC: 24 U/L
BASOPHILS # BLD AUTO: 0.04 K/UL
BASOPHILS NFR BLD AUTO: 0.6 %
BILIRUB SERPL-MCNC: 0.3 MG/DL
BUN SERPL-MCNC: 12 MG/DL
CALCIUM SERPL-MCNC: 10 MG/DL
CALCIUM SERPL-MCNC: 10 MG/DL
CHLORIDE SERPL-SCNC: 95 MMOL/L
CHOLEST SERPL-MCNC: 162 MG/DL
CO2 SERPL-SCNC: 29 MMOL/L
CREAT SERPL-MCNC: 0.71 MG/DL
EGFR: 99 ML/MIN/1.73M2
EOSINOPHIL # BLD AUTO: 0.2 K/UL
EOSINOPHIL NFR BLD AUTO: 3.2 %
GLUCOSE SERPL-MCNC: 99 MG/DL
HCT VFR BLD CALC: 46.5 %
HDLC SERPL-MCNC: 64 MG/DL
HGB BLD-MCNC: 14.9 G/DL
IMM GRANULOCYTES NFR BLD AUTO: 0.3 %
LDLC SERPL CALC-MCNC: 76 MG/DL
LYMPHOCYTES # BLD AUTO: 1.57 K/UL
LYMPHOCYTES NFR BLD AUTO: 25 %
MAN DIFF?: NORMAL
MCHC RBC-ENTMCNC: 28 PG
MCHC RBC-ENTMCNC: 32 GM/DL
MCV RBC AUTO: 87.4 FL
MONOCYTES # BLD AUTO: 0.64 K/UL
MONOCYTES NFR BLD AUTO: 10.2 %
NEUTROPHILS # BLD AUTO: 3.8 K/UL
NEUTROPHILS NFR BLD AUTO: 60.7 %
NONHDLC SERPL-MCNC: 98 MG/DL
PARATHYROID HORMONE INTACT: 45 PG/ML
PLATELET # BLD AUTO: 314 K/UL
POTASSIUM SERPL-SCNC: 4.6 MMOL/L
PROT SERPL-MCNC: 7.2 G/DL
RBC # BLD: 5.32 M/UL
RBC # FLD: 14.9 %
SODIUM SERPL-SCNC: 134 MMOL/L
T4 FREE SERPL-MCNC: 1.1 NG/DL
T4 SERPL-MCNC: 6.2 UG/DL
TRIGL SERPL-MCNC: 109 MG/DL
TSH SERPL-ACNC: 0.48 UIU/ML
WBC # FLD AUTO: 6.27 K/UL

## 2022-05-09 LAB
24R-OH-CALCIDIOL SERPL-MCNC: 58.4 PG/ML
ERYTHROCYTE [SEDIMENTATION RATE] IN BLOOD BY WESTERGREN METHOD: 17 MM/HR

## 2022-05-19 ENCOUNTER — APPOINTMENT (OUTPATIENT)
Dept: ENDOCRINOLOGY | Facility: CLINIC | Age: 72
End: 2022-05-19
Payer: COMMERCIAL

## 2022-05-19 VITALS
HEART RATE: 72 BPM | BODY MASS INDEX: 35.7 KG/M2 | SYSTOLIC BLOOD PRESSURE: 101 MMHG | WEIGHT: 241.06 LBS | HEIGHT: 69 IN | DIASTOLIC BLOOD PRESSURE: 80 MMHG | OXYGEN SATURATION: 96 %

## 2022-05-19 DIAGNOSIS — E87.1 HYPO-OSMOLALITY AND HYPONATREMIA: ICD-10-CM

## 2022-05-19 DIAGNOSIS — R74.8 ABNORMAL LEVELS OF OTHER SERUM ENZYMES: ICD-10-CM

## 2022-05-19 PROCEDURE — 99214 OFFICE O/P EST MOD 30 MIN: CPT | Mod: 25

## 2022-05-19 RX ORDER — SENNOSIDES 8.6 MG TABLETS 8.6 MG/1
8.6 TABLET ORAL
Qty: 30 | Refills: 0 | Status: COMPLETED | COMMUNITY
Start: 2021-10-30 | End: 2022-05-19

## 2022-05-19 NOTE — PHYSICAL EXAM
[Alert] : alert [Well Nourished] : well nourished [No Acute Distress] : no acute distress [Well Developed] : well developed [Normal Sclera/Conjunctiva] : normal sclera/conjunctiva [EOMI] : extra ocular movement intact [No Proptosis] : no proptosis [Normal Oropharynx] : the oropharynx was normal [Well Healed Scar] : well healed scar [No Respiratory Distress] : no respiratory distress [No Accessory Muscle Use] : no accessory muscle use [Clear to Auscultation] : lungs were clear to auscultation bilaterally [Normal S1, S2] : normal S1 and S2 [Normal Rate] : heart rate was normal [Regular Rhythm] : with a regular rhythm [No Edema] : no peripheral edema [Pedal Pulses Normal] : the pedal pulses are present [Normal Bowel Sounds] : normal bowel sounds [Not Tender] : non-tender [Not Distended] : not distended [Soft] : abdomen soft [Normal Anterior Cervical Nodes] : no anterior cervical lymphadenopathy [No Spinal Tenderness] : no spinal tenderness [Spine Straight] : spine straight [No Stigmata of Cushings Syndrome] : no stigmata of Cushings Syndrome [Normal Gait] : normal gait [Normal Strength/Tone] : muscle strength and tone were normal [No Rash] : no rash [Acanthosis Nigricans] : no acanthosis nigricans [Normal Reflexes] : deep tendon reflexes were 2+ and symmetric [No Tremors] : no tremors [Oriented x3] : oriented to person, place, and time [de-identified] : non - tender no mass felt, feels tight  [de-identified] : - Marcus

## 2022-05-19 NOTE — ASSESSMENT
[FreeTextEntry1] : Patient with a MNG and recent labs shows a mildly suppressed TSH, repeated labs the same .Thyroid uptake and scan shows heterogenous gland R>L to r/o  no hot nodule; uptake 24 %. .Recent repeat nuclear scan at [place] was 54 %.   His dominant nodule was quite large and with some pressure symptoms. He is 12 mos post thyroidectomy.  On Synthroid 137 recent TFTs nl.   .Calcium off calcium supplements recently was slightly elevated . Drinks a lot of water so was told to  decrease by one glass/day due to slightly low sodium. \par to f/u in 6 mos with new labs  Recent sono shows in R thyroid fossa a 7 mm nodule; repeat one year.  \par Labs show elevated alk phosphatase; related to spinal fusion ? \par  repeat today.  [Levothyroxine] : The patient was instructed to take Levothyroxine on an empty stomach, separate from vitamins, and wait at least 30 minutes before eating

## 2022-05-19 NOTE — HISTORY OF PRESENT ILLNESS
[FreeTextEntry1] : Patient is a 70 y/o man who was found to have an enlarged thyroid last year. He was noting intermittent pain on the right side of his neck. A sonogram done 4/2019 shows multiple nodules with largest on right being 5 cm; had FNA that was [place] 2. Denies hoarseness or difficulty swallowing. He has no prior h/o of thyroid problems. TSH has been suppressed with normal T4 and T3, antibodies neg. Recently did a thyroid uptake and scan RAIU 24 %. he did meet with the surgeon who felt given his cervical spine situation surgery may be risky in terms of positioning.. . he does note palpitations at times and difficulty sleeping and fatigue. \par He saw a second surgeon Dr Brooks who repeated the thyroid scan and has suggested a total thyroidectomy. He had been on MTM 10 mg OD. now off one mos post thyroidectomy. Was over 100 G and benign. Now on Synthroid 137 ug \par Had surgery for a spine problem after a recent fall over a ladder. Now getting PT.

## 2022-05-24 LAB
ALBUMIN SERPL ELPH-MCNC: 5.1 G/DL
ALP BLD-CCNC: 140 IU/L
ALP BLD-CCNC: 147 U/L
ALP BONE CFR SERPL: 72 %
ALP INTEST CFR SERPL: 1 %
ALP LIVER CFR SERPL: 27 %
ALT SERPL-CCNC: 35 U/L
ANION GAP SERPL CALC-SCNC: 14 MMOL/L
APPEARANCE: CLEAR
AST SERPL-CCNC: 27 U/L
BILIRUB SERPL-MCNC: 0.2 MG/DL
BILIRUBIN URINE: NEGATIVE
BLOOD URINE: NEGATIVE
BUN SERPL-MCNC: 15 MG/DL
CALCIUM SERPL-MCNC: 10.2 MG/DL
CHLORIDE SERPL-SCNC: 92 MMOL/L
CO2 SERPL-SCNC: 27 MMOL/L
COLOR: NORMAL
CREAT SERPL-MCNC: 0.72 MG/DL
EGFR: 98 ML/MIN/1.73M2
GGT SERPL-CCNC: 49 U/L
GLUCOSE QUALITATIVE U: NEGATIVE
GLUCOSE SERPL-MCNC: 111 MG/DL
KETONES URINE: NEGATIVE
LEUKOCYTE ESTERASE URINE: NEGATIVE
NITRITE URINE: NEGATIVE
PH URINE: 6.5
POTASSIUM SERPL-SCNC: 4.5 MMOL/L
PROT SERPL-MCNC: 7.4 G/DL
PROTEIN URINE: NEGATIVE
SODIUM SERPL-SCNC: 133 MMOL/L
SPECIFIC GRAVITY URINE: 1.01
UROBILINOGEN URINE: NORMAL

## 2022-07-18 ENCOUNTER — RX RENEWAL (OUTPATIENT)
Age: 72
End: 2022-07-18

## 2022-07-20 ENCOUNTER — APPOINTMENT (OUTPATIENT)
Dept: HEART AND VASCULAR | Facility: CLINIC | Age: 72
End: 2022-07-20

## 2022-07-20 VITALS — SYSTOLIC BLOOD PRESSURE: 120 MMHG | DIASTOLIC BLOOD PRESSURE: 80 MMHG | HEART RATE: 68 BPM

## 2022-07-20 PROCEDURE — 99214 OFFICE O/P EST MOD 30 MIN: CPT | Mod: 25

## 2022-07-20 PROCEDURE — 93000 ELECTROCARDIOGRAM COMPLETE: CPT

## 2022-07-20 RX ORDER — OMEPRAZOLE 40 MG/1
40 CAPSULE, DELAYED RELEASE ORAL
Qty: 15 | Refills: 0 | Status: DISCONTINUED | COMMUNITY
Start: 2021-10-15 | End: 2022-07-20

## 2022-07-20 NOTE — DISCUSSION/SUMMARY
[FreeTextEntry1] : EKG:NSR,RBBB\par reveiwed labs done in June- has hyponatremia- being followed by PCP\par continue valsartan for HPTN;Crestor for lipids/last LDL=77mg%\par meds refilled

## 2022-07-20 NOTE — PHYSICAL EXAM
[Well Developed] : well developed [Well Nourished] : well nourished [No Acute Distress] : no acute distress [Normal Conjunctiva] : normal conjunctiva [Normal Venous Pressure] : normal venous pressure [No Carotid Bruit] : no carotid bruit [Normal S1, S2] : normal S1, S2 [No Murmur] : no murmur [No Rub] : no rub [No Gallop] : no gallop [Clear Lung Fields] : clear lung fields [Good Air Entry] : good air entry [No Respiratory Distress] : no respiratory distress  [Soft] : abdomen soft [Non Tender] : non-tender [Normal Bowel Sounds] : normal bowel sounds [No Edema] : no edema [No Cyanosis] : no cyanosis [No Clubbing] : no clubbing [No Varicosities] : no varicosities [No Rash] : no rash [Moves all extremities] : moves all extremities [Normal Speech] : normal speech [Alert and Oriented] : alert and oriented [Normal memory] : normal memory [Normal Gait] : normal gait [de-identified] : O/W

## 2022-10-21 ENCOUNTER — RX RENEWAL (OUTPATIENT)
Age: 72
End: 2022-10-21

## 2022-11-17 ENCOUNTER — APPOINTMENT (OUTPATIENT)
Dept: ENDOCRINOLOGY | Facility: CLINIC | Age: 72
End: 2022-11-17

## 2022-11-17 VITALS
WEIGHT: 247.25 LBS | DIASTOLIC BLOOD PRESSURE: 80 MMHG | OXYGEN SATURATION: 94 % | HEIGHT: 69 IN | SYSTOLIC BLOOD PRESSURE: 128 MMHG | HEART RATE: 56 BPM | BODY MASS INDEX: 36.62 KG/M2

## 2022-11-17 PROCEDURE — 99214 OFFICE O/P EST MOD 30 MIN: CPT | Mod: 25

## 2022-11-17 PROCEDURE — 36415 COLL VENOUS BLD VENIPUNCTURE: CPT

## 2022-11-17 RX ORDER — DULOXETINE HYDROCHLORIDE 30 MG/1
30 CAPSULE, DELAYED RELEASE PELLETS ORAL
Qty: 90 | Refills: 0 | Status: ACTIVE | COMMUNITY
Start: 2022-10-25

## 2022-11-17 RX ORDER — RASAGILINE 1 MG/1
1 TABLET ORAL
Qty: 90 | Refills: 0 | Status: ACTIVE | COMMUNITY
Start: 2022-10-25

## 2022-11-17 NOTE — PHYSICAL EXAM
[Alert] : alert [Well Nourished] : well nourished [No Acute Distress] : no acute distress [Well Developed] : well developed [Normal Sclera/Conjunctiva] : normal sclera/conjunctiva [EOMI] : extra ocular movement intact [No Proptosis] : no proptosis [Normal Oropharynx] : the oropharynx was normal [Well Healed Scar] : well healed scar [No Respiratory Distress] : no respiratory distress [No Accessory Muscle Use] : no accessory muscle use [Clear to Auscultation] : lungs were clear to auscultation bilaterally [Normal S1, S2] : normal S1 and S2 [Normal Rate] : heart rate was normal [Regular Rhythm] : with a regular rhythm [No Edema] : no peripheral edema [Pedal Pulses Normal] : the pedal pulses are present [Normal Bowel Sounds] : normal bowel sounds [Not Tender] : non-tender [Not Distended] : not distended [Soft] : abdomen soft [Normal Anterior Cervical Nodes] : no anterior cervical lymphadenopathy [No Spinal Tenderness] : no spinal tenderness [Spine Straight] : spine straight [No Stigmata of Cushings Syndrome] : no stigmata of Cushings Syndrome [Normal Gait] : normal gait [Normal Strength/Tone] : muscle strength and tone were normal [No Rash] : no rash [Acanthosis Nigricans] : no acanthosis nigricans [Normal Reflexes] : deep tendon reflexes were 2+ and symmetric [No Tremors] : no tremors [Oriented x3] : oriented to person, place, and time [de-identified] : non - tender no mass felt, feels tight  [de-identified] : - Marcus

## 2022-11-17 NOTE — ASSESSMENT
[FreeTextEntry1] : Patient with a MNG and recent labs shows a mildly suppressed TSH, repeated labs the same .Thyroid uptake and scan shows heterogenous gland R>L to r/o  no hot nodule; uptake 24 %. .Recent repeat nuclear scan at [place] was 54 %.   His dominant nodule was quite large and with some pressure symptoms. He is 12 mos post thyroidectomy.  On Synthroid 137 recent TFTs nl.   .Calcium off calcium supplements recently was slightly elevated . Drinks a lot of water so was told to  decrease by one glass/day due to slightly low sodium. Also a lot of migdalia tea. \par to f/u in 6 mos with new labs  Recent sono shows in R thyroid fossa a 7 mm nodule; repeat one year.  \par Labs show elevated alk phosphatase; related to spinal fusion ? \par  repeat today.  [Levothyroxine] : The patient was instructed to take Levothyroxine on an empty stomach, separate from vitamins, and wait at least 30 minutes before eating

## 2022-11-17 NOTE — HISTORY OF PRESENT ILLNESS
[FreeTextEntry1] : Patient is a 72 y/o man who was found to have an enlarged thyroid last year. He was noting intermittent pain on the right side of his neck. A sonogram done 4/2019 shows multiple nodules with largest on right being 5 cm; had FNA that was [place] 2. Denies hoarseness or difficulty swallowing. He has no prior h/o of thyroid problems. TSH has been suppressed with normal T4 and T3, antibodies neg. A thyroid uptake and scan RAIU 24 %. he did meet with the surgeon who felt given his cervical spine situation surgery may be risky in terms of positioning.. . he does note palpitations at times and difficulty sleeping and fatigue. \par He saw a second surgeon Dr Brooks who repeated the thyroid scan and has suggested a total thyroidectomy. He had been on MTM 10 mg OD. now off one mos post thyroidectomy. Was over 100 G and benign. Now on Synthroid 137 ug \par Had surgery for a spine problem after a recent fall over a ladder. Now getting PT.  Had a procedure in 8/22 for trigeminal nerve on the right and helped with pain but still numb,

## 2022-12-01 ENCOUNTER — NON-APPOINTMENT (OUTPATIENT)
Age: 72
End: 2022-12-01

## 2022-12-01 LAB
25(OH)D3 SERPL-MCNC: 25.6 NG/ML
ALBUMIN SERPL ELPH-MCNC: 4.7 G/DL
ALP BLD-CCNC: 147 U/L
ALT SERPL-CCNC: 32 U/L
ANION GAP SERPL CALC-SCNC: 10 MMOL/L
AST SERPL-CCNC: 31 U/L
BILIRUB SERPL-MCNC: 0.4 MG/DL
BUN SERPL-MCNC: 16 MG/DL
CALCIUM SERPL-MCNC: 9.6 MG/DL
CALCIUM SERPL-MCNC: 9.6 MG/DL
CHLORIDE SERPL-SCNC: 101 MMOL/L
CO2 SERPL-SCNC: 29 MMOL/L
CREAT SERPL-MCNC: 0.92 MG/DL
EGFR: 89 ML/MIN/1.73M2
GLUCOSE SERPL-MCNC: 102 MG/DL
PARATHYROID HORMONE INTACT: 61 PG/ML
POTASSIUM SERPL-SCNC: 5 MMOL/L
PROT SERPL-MCNC: 7.6 G/DL
SODIUM SERPL-SCNC: 140 MMOL/L
T4 FREE SERPL-MCNC: 1.3 NG/DL
T4 SERPL-MCNC: 8.2 UG/DL
TSH SERPL-ACNC: 1.45 UIU/ML

## 2022-12-16 ENCOUNTER — NON-APPOINTMENT (OUTPATIENT)
Age: 72
End: 2022-12-16

## 2022-12-16 ENCOUNTER — APPOINTMENT (OUTPATIENT)
Dept: HEART AND VASCULAR | Facility: CLINIC | Age: 72
End: 2022-12-16

## 2022-12-16 VITALS
DIASTOLIC BLOOD PRESSURE: 94 MMHG | OXYGEN SATURATION: 98 % | HEIGHT: 69 IN | WEIGHT: 252 LBS | BODY MASS INDEX: 37.33 KG/M2 | HEART RATE: 63 BPM | SYSTOLIC BLOOD PRESSURE: 167 MMHG | TEMPERATURE: 98 F

## 2022-12-16 VITALS — DIASTOLIC BLOOD PRESSURE: 82 MMHG | SYSTOLIC BLOOD PRESSURE: 132 MMHG

## 2022-12-16 DIAGNOSIS — R25.1 TREMOR, UNSPECIFIED: ICD-10-CM

## 2022-12-16 PROCEDURE — 93000 ELECTROCARDIOGRAM COMPLETE: CPT

## 2022-12-16 PROCEDURE — 99214 OFFICE O/P EST MOD 30 MIN: CPT | Mod: 25

## 2022-12-16 RX ORDER — DEXAMETHASONE 2 MG/1
2 TABLET ORAL
Qty: 13 | Refills: 0 | Status: DISCONTINUED | COMMUNITY
Start: 2022-08-26 | End: 2022-12-16

## 2022-12-16 RX ORDER — DIAZEPAM 2 MG/1
2 TABLET ORAL
Qty: 30 | Refills: 0 | Status: DISCONTINUED | COMMUNITY
Start: 2021-10-31 | End: 2022-12-16

## 2022-12-16 RX ORDER — CITALOPRAM 20 MG/1
20 TABLET, FILM COATED ORAL
Refills: 0 | Status: DISCONTINUED | COMMUNITY
End: 2022-12-16

## 2022-12-16 RX ORDER — OXYCODONE 5 MG/1
5 TABLET ORAL
Qty: 30 | Refills: 0 | Status: DISCONTINUED | COMMUNITY
Start: 2022-09-04 | End: 2022-12-16

## 2022-12-16 RX ORDER — ACETAMINOPHEN 325 MG/1
325 TABLET ORAL
Qty: 100 | Refills: 0 | Status: DISCONTINUED | COMMUNITY
Start: 2022-08-26 | End: 2022-12-16

## 2022-12-16 RX ORDER — PANTOPRAZOLE 40 MG/1
40 TABLET, DELAYED RELEASE ORAL
Qty: 5 | Refills: 0 | Status: DISCONTINUED | COMMUNITY
Start: 2022-08-26 | End: 2022-12-16

## 2022-12-16 RX ORDER — PROPRANOLOL HYDROCHLORIDE 60 MG/1
60 CAPSULE, EXTENDED RELEASE ORAL
Qty: 30 | Refills: 0 | Status: ACTIVE | COMMUNITY
Start: 2022-10-25

## 2022-12-16 NOTE — DISCUSSION/SUMMARY
[FreeTextEntry1] : EKG:NSR\par needs to lose weight\par continue valsartan for HPTN; Crestor for HLD\par would add HCTZ for better BP control

## 2022-12-16 NOTE — PHYSICAL EXAM
[Well Developed] : well developed [Well Nourished] : well nourished [No Acute Distress] : no acute distress [Normal Conjunctiva] : normal conjunctiva [Normal Venous Pressure] : normal venous pressure [No Carotid Bruit] : no carotid bruit [Normal S1, S2] : normal S1, S2 [No Murmur] : no murmur [No Rub] : no rub [No Gallop] : no gallop [Clear Lung Fields] : clear lung fields [Good Air Entry] : good air entry [No Respiratory Distress] : no respiratory distress  [Soft] : abdomen soft [Non Tender] : non-tender [Normal Bowel Sounds] : normal bowel sounds [Normal Gait] : normal gait [No Edema] : no edema [No Cyanosis] : no cyanosis [No Clubbing] : no clubbing [No Varicosities] : no varicosities [No Rash] : no rash [Moves all extremities] : moves all extremities [Normal Speech] : normal speech [Alert and Oriented] : alert and oriented [Normal memory] : normal memory [de-identified] : O/W

## 2023-04-13 ENCOUNTER — RX RENEWAL (OUTPATIENT)
Age: 73
End: 2023-04-13

## 2023-04-19 ENCOUNTER — NON-APPOINTMENT (OUTPATIENT)
Age: 73
End: 2023-04-19

## 2023-04-19 ENCOUNTER — APPOINTMENT (OUTPATIENT)
Dept: HEART AND VASCULAR | Facility: CLINIC | Age: 73
End: 2023-04-19
Payer: COMMERCIAL

## 2023-04-19 VITALS
WEIGHT: 242 LBS | HEIGHT: 69 IN | HEART RATE: 68 BPM | DIASTOLIC BLOOD PRESSURE: 77 MMHG | TEMPERATURE: 96.4 F | OXYGEN SATURATION: 95 % | BODY MASS INDEX: 35.84 KG/M2 | SYSTOLIC BLOOD PRESSURE: 131 MMHG

## 2023-04-19 PROCEDURE — 93000 ELECTROCARDIOGRAM COMPLETE: CPT

## 2023-04-19 PROCEDURE — 99214 OFFICE O/P EST MOD 30 MIN: CPT | Mod: 25

## 2023-04-19 RX ORDER — VALSARTAN 160 MG/1
160 TABLET, COATED ORAL DAILY
Qty: 1 | Refills: 1 | Status: DISCONTINUED | COMMUNITY
End: 2023-04-19

## 2023-04-19 NOTE — PHYSICAL EXAM
[Well Developed] : well developed [Well Nourished] : well nourished [No Acute Distress] : no acute distress [Normal Conjunctiva] : normal conjunctiva [Normal Venous Pressure] : normal venous pressure [No Carotid Bruit] : no carotid bruit [Normal S1, S2] : normal S1, S2 [No Murmur] : no murmur [No Rub] : no rub [No Gallop] : no gallop [Clear Lung Fields] : clear lung fields [Good Air Entry] : good air entry [No Respiratory Distress] : no respiratory distress  [Soft] : abdomen soft [Non Tender] : non-tender [Normal Bowel Sounds] : normal bowel sounds [Normal Gait] : normal gait [No Edema] : no edema [No Cyanosis] : no cyanosis [No Clubbing] : no clubbing [No Varicosities] : no varicosities [No Rash] : no rash [Moves all extremities] : moves all extremities [Normal Speech] : normal speech [Alert and Oriented] : alert and oriented [Normal memory] : normal memory [de-identified] : O/W

## 2023-04-19 NOTE — DISCUSSION/SUMMARY
[FreeTextEntry1] : EKG:NSR,IRBBB,PVC\par he's unsure if he's taking HCTZ- he will check and call\par continue valsartan + HCTZ for HPTN;Crestor for HLD;\par he will be getting labs with PCP tomorrow\par no treatment for asx PVC\par to call if develops palpitations and will treat PVC

## 2023-04-19 NOTE — ADDENDUM
[FreeTextEntry1] : pt called- he states he was told to stop valsartan and just take HCTZ(he thinks it was PCP)\par  and he hasn't  been taking valsartan in several months

## 2023-04-20 ENCOUNTER — RX RENEWAL (OUTPATIENT)
Age: 73
End: 2023-04-20

## 2023-05-10 ENCOUNTER — APPOINTMENT (OUTPATIENT)
Dept: CARDIOLOGY | Facility: CLINIC | Age: 73
End: 2023-05-10
Payer: COMMERCIAL

## 2023-05-10 DIAGNOSIS — I35.1 NONRHEUMATIC AORTIC (VALVE) INSUFFICIENCY: ICD-10-CM

## 2023-05-10 PROCEDURE — 93306 TTE W/DOPPLER COMPLETE: CPT

## 2023-05-18 ENCOUNTER — APPOINTMENT (OUTPATIENT)
Dept: ENDOCRINOLOGY | Facility: CLINIC | Age: 73
End: 2023-05-18
Payer: COMMERCIAL

## 2023-05-18 VITALS
HEART RATE: 68 BPM | BODY MASS INDEX: 36.34 KG/M2 | OXYGEN SATURATION: 95 % | WEIGHT: 245.38 LBS | SYSTOLIC BLOOD PRESSURE: 119 MMHG | DIASTOLIC BLOOD PRESSURE: 67 MMHG | HEIGHT: 69 IN

## 2023-05-18 PROCEDURE — 36415 COLL VENOUS BLD VENIPUNCTURE: CPT

## 2023-05-18 PROCEDURE — 99214 OFFICE O/P EST MOD 30 MIN: CPT | Mod: 25

## 2023-05-18 NOTE — PHYSICAL EXAM
[Alert] : alert [Well Nourished] : well nourished [No Acute Distress] : no acute distress [Well Developed] : well developed [Normal Sclera/Conjunctiva] : normal sclera/conjunctiva [EOMI] : extra ocular movement intact [No Proptosis] : no proptosis [Normal Oropharynx] : the oropharynx was normal [Well Healed Scar] : well healed scar [No Respiratory Distress] : no respiratory distress [No Accessory Muscle Use] : no accessory muscle use [Clear to Auscultation] : lungs were clear to auscultation bilaterally [Normal S1, S2] : normal S1 and S2 [Normal Rate] : heart rate was normal [Regular Rhythm] : with a regular rhythm [No Edema] : no peripheral edema [Pedal Pulses Normal] : the pedal pulses are present [Normal Bowel Sounds] : normal bowel sounds [Not Tender] : non-tender [Not Distended] : not distended [Soft] : abdomen soft [Normal Anterior Cervical Nodes] : no anterior cervical lymphadenopathy [No Spinal Tenderness] : no spinal tenderness [Spine Straight] : spine straight [No Stigmata of Cushings Syndrome] : no stigmata of Cushings Syndrome [Normal Gait] : normal gait [Normal Strength/Tone] : muscle strength and tone were normal [No Rash] : no rash [Acanthosis Nigricans] : no acanthosis nigricans [Normal Reflexes] : deep tendon reflexes were 2+ and symmetric [No Tremors] : no tremors [Oriented x3] : oriented to person, place, and time [de-identified] : non - tender no mass felt, feels tight  [de-identified] : - Marcus

## 2023-05-18 NOTE — ASSESSMENT
[Levothyroxine] : The patient was instructed to take Levothyroxine on an empty stomach, separate from vitamins, and wait at least 30 minutes before eating [FreeTextEntry1] : Patient with a MNG and recent labs shows a mildly suppressed TSH, repeated labs the same .Thyroid uptake and scan shows heterogenous gland R>L to r/o  no hot nodule; uptake 24 %. .Recent repeat nuclear scan at [place] was 54 %.   His dominant nodule was quite large and with some pressure symptoms. He is 12 mos post thyroidectomy.  On Synthroid 137 recent TFTs nl.   .Calcium off calcium supplements recently was slightly elevated . Drinks a lot of water so was told to  decrease by one glass/day due to slightly low sodium. Also a lot of migdalia tea. \par to f/u in 6 mos with new labs  Recent sono shows in R thyroid fossa a 7 mm nodule; repeat one year.  \par Labs show elevated alk phosphatase; related to spinal fusion ? \par  repeat today.

## 2023-05-18 NOTE — HISTORY OF PRESENT ILLNESS
[FreeTextEntry1] : Patient is a 72 y/o man who was found to have an enlarged thyroid last year. He was noting intermittent pain on the right side of his neck. A sonogram done 4/2019 shows multiple nodules with largest on right being 5 cm; had FNA that was [place] 2. Denies hoarseness or difficulty swallowing. He has no prior h/o of thyroid problems. TSH has been suppressed with normal T4 and T3, antibodies neg. A thyroid uptake and scan RAIU 24 %. he did meet with the surgeon who felt given his cervical spine situation surgery may be risky in terms of positioning.. . he does note palpitations at times and difficulty sleeping and fatigue. \par He saw a second surgeon Dr Brooks who repeated the thyroid scan and has suggested a total thyroidectomy. He had been on MTM 10 mg OD. now off one mos post thyroidectomy. Was over 100 G and benign. Now on Synthroid 137 ug \par Had surgery for a spine problem after a recent fall over a ladder. Now getting PT.  Had a procedure in 8/22 for trigeminal nerve on the right and helped with pain but still numb,

## 2023-05-22 LAB
ALBUMIN SERPL ELPH-MCNC: 4.7 G/DL
ALP BLD-CCNC: 94 U/L
ALT SERPL-CCNC: 29 U/L
ANION GAP SERPL CALC-SCNC: 13 MMOL/L
AST SERPL-CCNC: 28 U/L
BILIRUB SERPL-MCNC: 0.5 MG/DL
BUN SERPL-MCNC: 19 MG/DL
CALCIUM SERPL-MCNC: 9.8 MG/DL
CALCIUM SERPL-MCNC: 9.8 MG/DL
CHLORIDE SERPL-SCNC: 100 MMOL/L
CO2 SERPL-SCNC: 28 MMOL/L
CREAT SERPL-MCNC: 0.91 MG/DL
EGFR: 90 ML/MIN/1.73M2
GLUCOSE SERPL-MCNC: 105 MG/DL
PARATHYROID HORMONE INTACT: 51 PG/ML
POTASSIUM SERPL-SCNC: 5 MMOL/L
PROT SERPL-MCNC: 7.3 G/DL
SODIUM SERPL-SCNC: 142 MMOL/L
T4 FREE SERPL-MCNC: 1.5 NG/DL
T4 SERPL-MCNC: 8.4 UG/DL
TSH SERPL-ACNC: 0.17 UIU/ML

## 2023-05-24 RX ORDER — LEVOTHYROXINE SODIUM 125 UG/1
125 TABLET ORAL
Qty: 14 | Refills: 0 | Status: ACTIVE | OUTPATIENT
Start: 2023-05-23

## 2023-07-28 ENCOUNTER — NON-APPOINTMENT (OUTPATIENT)
Age: 73
End: 2023-07-28

## 2023-07-28 ENCOUNTER — APPOINTMENT (OUTPATIENT)
Dept: HEART AND VASCULAR | Facility: CLINIC | Age: 73
End: 2023-07-28
Payer: COMMERCIAL

## 2023-07-28 VITALS
BODY MASS INDEX: 36.29 KG/M2 | WEIGHT: 245 LBS | HEART RATE: 55 BPM | DIASTOLIC BLOOD PRESSURE: 62 MMHG | HEIGHT: 69 IN | SYSTOLIC BLOOD PRESSURE: 112 MMHG | OXYGEN SATURATION: 95 % | TEMPERATURE: 97.9 F

## 2023-07-28 PROCEDURE — 99214 OFFICE O/P EST MOD 30 MIN: CPT | Mod: 25

## 2023-07-28 PROCEDURE — 93000 ELECTROCARDIOGRAM COMPLETE: CPT

## 2023-07-28 NOTE — DISCUSSION/SUMMARY
[FreeTextEntry1] : EKG:NSR,PVC\par continue HCTZ for HPTN;Crestor for HLD\par no treatment(for now) for asymptomatic PVC's- to call if develops Sx

## 2023-07-28 NOTE — PHYSICAL EXAM
[Well Developed] : well developed [Well Nourished] : well nourished [No Acute Distress] : no acute distress [Normal Conjunctiva] : normal conjunctiva [Normal Venous Pressure] : normal venous pressure [No Carotid Bruit] : no carotid bruit [Normal S1, S2] : normal S1, S2 [No Murmur] : no murmur [No Rub] : no rub [No Gallop] : no gallop [Clear Lung Fields] : clear lung fields [Good Air Entry] : good air entry [No Respiratory Distress] : no respiratory distress  [Soft] : abdomen soft [Non Tender] : non-tender [Normal Bowel Sounds] : normal bowel sounds [Normal Gait] : normal gait [No Edema] : no edema [No Cyanosis] : no cyanosis [No Clubbing] : no clubbing [No Varicosities] : no varicosities [No Rash] : no rash [Moves all extremities] : moves all extremities [Normal Speech] : normal speech [Alert and Oriented] : alert and oriented [Normal memory] : normal memory [de-identified] : O/W

## 2023-10-03 ENCOUNTER — APPOINTMENT (OUTPATIENT)
Dept: NEUROSURGERY | Facility: CLINIC | Age: 73
End: 2023-10-03
Payer: OTHER MISCELLANEOUS

## 2023-10-03 DIAGNOSIS — M54.50 LOW BACK PAIN, UNSPECIFIED: ICD-10-CM

## 2023-10-03 PROCEDURE — 99204 OFFICE O/P NEW MOD 45 MIN: CPT

## 2023-10-05 ENCOUNTER — APPOINTMENT (OUTPATIENT)
Dept: ENDOCRINOLOGY | Facility: CLINIC | Age: 73
End: 2023-10-05
Payer: COMMERCIAL

## 2023-10-05 VITALS
BODY MASS INDEX: 35.99 KG/M2 | HEIGHT: 69 IN | HEART RATE: 62 BPM | SYSTOLIC BLOOD PRESSURE: 121 MMHG | DIASTOLIC BLOOD PRESSURE: 76 MMHG | WEIGHT: 243 LBS | OXYGEN SATURATION: 94 %

## 2023-10-05 DIAGNOSIS — E55.9 VITAMIN D DEFICIENCY, UNSPECIFIED: ICD-10-CM

## 2023-10-05 DIAGNOSIS — E89.0 POSTPROCEDURAL HYPOTHYROIDISM: ICD-10-CM

## 2023-10-05 PROCEDURE — 36415 COLL VENOUS BLD VENIPUNCTURE: CPT

## 2023-10-05 PROCEDURE — 99214 OFFICE O/P EST MOD 30 MIN: CPT | Mod: 25

## 2023-10-17 ENCOUNTER — RX RENEWAL (OUTPATIENT)
Age: 73
End: 2023-10-17

## 2023-11-02 ENCOUNTER — RX RENEWAL (OUTPATIENT)
Age: 73
End: 2023-11-02

## 2023-11-02 LAB
25(OH)D3 SERPL-MCNC: 29.6 NG/ML
ALBUMIN SERPL ELPH-MCNC: 4.6 G/DL
ALP BLD-CCNC: 91 U/L
ALT SERPL-CCNC: 28 U/L
ANION GAP SERPL CALC-SCNC: 11 MMOL/L
AST SERPL-CCNC: 33 U/L
BILIRUB SERPL-MCNC: 0.7 MG/DL
BUN SERPL-MCNC: 19 MG/DL
CALCIUM SERPL-MCNC: 10.2 MG/DL
CHLORIDE SERPL-SCNC: 99 MMOL/L
CO2 SERPL-SCNC: 30 MMOL/L
CREAT SERPL-MCNC: 0.95 MG/DL
EGFR: 86 ML/MIN/1.73M2
GLUCOSE SERPL-MCNC: 108 MG/DL
POTASSIUM SERPL-SCNC: 4.6 MMOL/L
PROT SERPL-MCNC: 7.5 G/DL
SODIUM SERPL-SCNC: 140 MMOL/L
T4 FREE SERPL-MCNC: 1.4 NG/DL
T4 SERPL-MCNC: 8.4 UG/DL
TSH SERPL-ACNC: 0.59 UIU/ML

## 2023-11-02 RX ORDER — LEVOTHYROXINE SODIUM 0.12 MG/1
125 TABLET ORAL
Qty: 90 | Refills: 3 | Status: ACTIVE | COMMUNITY
Start: 2023-04-13 | End: 1900-01-01

## 2023-11-16 ENCOUNTER — APPOINTMENT (OUTPATIENT)
Dept: ENDOCRINOLOGY | Facility: CLINIC | Age: 73
End: 2023-11-16

## 2023-11-21 ENCOUNTER — APPOINTMENT (OUTPATIENT)
Dept: NEUROSURGERY | Facility: CLINIC | Age: 73
End: 2023-11-21

## 2023-11-30 ENCOUNTER — NON-APPOINTMENT (OUTPATIENT)
Age: 73
End: 2023-11-30

## 2023-11-30 ENCOUNTER — APPOINTMENT (OUTPATIENT)
Dept: HEART AND VASCULAR | Facility: CLINIC | Age: 73
End: 2023-11-30
Payer: COMMERCIAL

## 2023-11-30 VITALS — DIASTOLIC BLOOD PRESSURE: 74 MMHG | SYSTOLIC BLOOD PRESSURE: 120 MMHG

## 2023-11-30 VITALS
OXYGEN SATURATION: 95 % | DIASTOLIC BLOOD PRESSURE: 88 MMHG | HEIGHT: 69 IN | HEART RATE: 67 BPM | BODY MASS INDEX: 35.84 KG/M2 | WEIGHT: 242 LBS | TEMPERATURE: 97.9 F | SYSTOLIC BLOOD PRESSURE: 140 MMHG

## 2023-11-30 DIAGNOSIS — I10 ESSENTIAL (PRIMARY) HYPERTENSION: ICD-10-CM

## 2023-11-30 DIAGNOSIS — I45.10 UNSPECIFIED RIGHT BUNDLE-BRANCH BLOCK: ICD-10-CM

## 2023-11-30 DIAGNOSIS — Z01.810 ENCOUNTER FOR PREPROCEDURAL CARDIOVASCULAR EXAMINATION: ICD-10-CM

## 2023-11-30 DIAGNOSIS — I49.3 VENTRICULAR PREMATURE DEPOLARIZATION: ICD-10-CM

## 2023-11-30 DIAGNOSIS — E78.5 HYPERLIPIDEMIA, UNSPECIFIED: ICD-10-CM

## 2023-11-30 PROCEDURE — 99214 OFFICE O/P EST MOD 30 MIN: CPT | Mod: 25

## 2023-11-30 PROCEDURE — 93000 ELECTROCARDIOGRAM COMPLETE: CPT

## 2023-12-11 ENCOUNTER — APPOINTMENT (OUTPATIENT)
Dept: NEUROSURGERY | Facility: CLINIC | Age: 73
End: 2023-12-11
Payer: OTHER MISCELLANEOUS

## 2023-12-11 VITALS — HEIGHT: 69 IN | WEIGHT: 242 LBS | BODY MASS INDEX: 35.84 KG/M2

## 2023-12-11 DIAGNOSIS — M96.1 POSTLAMINECTOMY SYNDROME, NOT ELSEWHERE CLASSIFIED: ICD-10-CM

## 2023-12-11 PROCEDURE — 99215 OFFICE O/P EST HI 40 MIN: CPT

## 2023-12-11 RX ORDER — HYDROCHLOROTHIAZIDE 12.5 MG/1
12.5 CAPSULE ORAL DAILY
Qty: 90 | Refills: 1 | Status: ACTIVE | COMMUNITY
Start: 2022-12-16 | End: 1900-01-01

## 2023-12-13 RX ORDER — EZETIMIBE 10 MG/1
10 TABLET ORAL
Qty: 1 | Refills: 1 | Status: ACTIVE | COMMUNITY
Start: 2023-12-11 | End: 1900-01-01

## 2024-01-08 NOTE — HISTORY OF PRESENT ILLNESS
[FreeTextEntry1] : This is a 74 yo M who presents for neurosurgical revisit with regards to SCS permanent implant. As a review, he was seen in the office approximately two months ago with complaints of persistent radiculopathy despite prior fusion (L3-4 L4-5). As a means of alleviating his pain, he underwent a SCS trial with Dr. Gleason (Medtronic) which provided excellent relief at 70% or higher with marked improvements in pain, function, and overall mobility.   Prior to surgical scheduled we had discussed the indication for an updated MR L spine w/o contrast which is now available for my review.  IMAGES: MR L spine- 10/31/2023- [place]- study reveals evidence of prior fusion with interbody spacer at L3-4 and L4-5. Clear bony fusion appreciated through L4-5. Interspacer devices appreciated at L3-4 and L4-5. L5-S1 right neuroforaminal stenosis to a modedate degree. Poor imaging quality; report scanned into chart.  PHYSICAL EXAM: Neurologic: CN II-XII grossly intact. except numbness to LT in right V2, V3 distribution Palpation: no pain to palpation Strength: Full strength in all major muscle groups, no atrophy Sensation: Full sensation to light touch in all extremities  Reflexes: 2+ patellar 2+ biceps 2+ ankle jerk No Gray's No clonus No Babinski  ROM restrcited in LS spine  Gait: antalgic

## 2024-01-08 NOTE — ASSESSMENT
[FreeTextEntry1] : 72 yo M presents for neurosurgical revisit with regards to lumbar radiculopathy. MR L spine reviewed, study reveals evidence of adjacent segment pathology and he expresses understanding. Considering his excellent relief from the SCS trial he is agreeable to proceed with permanent placement.  Expected goals of permanent implantation reviewed and he remains largely agreeable to proceed. We will request prompt authorization and will schedule at a mutually convenient time.  I personally reviewed with the PA, this patient's history and physical exam findings, as documented above. I have discussed the relevant areas of concern, having direct implications to the presenting problems and illnesses, and I have personally examined all pertinent and positive and negative findings, which impact the neurosurgical treatment plan.  DYLLAN Aquino MD

## 2024-01-10 ENCOUNTER — OUTPATIENT (OUTPATIENT)
Dept: OUTPATIENT SERVICES | Facility: HOSPITAL | Age: 74
LOS: 1 days | End: 2024-01-10
Payer: COMMERCIAL

## 2024-01-10 VITALS
TEMPERATURE: 98 F | HEART RATE: 67 BPM | RESPIRATION RATE: 16 BRPM | SYSTOLIC BLOOD PRESSURE: 153 MMHG | HEIGHT: 72 IN | DIASTOLIC BLOOD PRESSURE: 81 MMHG | WEIGHT: 240.08 LBS | OXYGEN SATURATION: 96 %

## 2024-01-10 DIAGNOSIS — Z01.818 ENCOUNTER FOR OTHER PREPROCEDURAL EXAMINATION: ICD-10-CM

## 2024-01-10 DIAGNOSIS — Z98.1 ARTHRODESIS STATUS: Chronic | ICD-10-CM

## 2024-01-10 DIAGNOSIS — G50.0 TRIGEMINAL NEURALGIA: Chronic | ICD-10-CM

## 2024-01-10 DIAGNOSIS — M96.1 POSTLAMINECTOMY SYNDROME, NOT ELSEWHERE CLASSIFIED: ICD-10-CM

## 2024-01-10 LAB
ALBUMIN SERPL ELPH-MCNC: 4.5 G/DL — SIGNIFICANT CHANGE UP (ref 3.5–5.2)
ALBUMIN SERPL ELPH-MCNC: 4.5 G/DL — SIGNIFICANT CHANGE UP (ref 3.5–5.2)
ALP SERPL-CCNC: 79 U/L — SIGNIFICANT CHANGE UP (ref 30–115)
ALP SERPL-CCNC: 79 U/L — SIGNIFICANT CHANGE UP (ref 30–115)
ALT FLD-CCNC: 149 U/L — HIGH (ref 0–41)
ALT FLD-CCNC: 149 U/L — HIGH (ref 0–41)
ANION GAP SERPL CALC-SCNC: 12 MMOL/L — SIGNIFICANT CHANGE UP (ref 7–14)
ANION GAP SERPL CALC-SCNC: 12 MMOL/L — SIGNIFICANT CHANGE UP (ref 7–14)
APTT BLD: 24.7 SEC — LOW (ref 27–39.2)
APTT BLD: 24.7 SEC — LOW (ref 27–39.2)
AST SERPL-CCNC: 94 U/L — HIGH (ref 0–41)
AST SERPL-CCNC: 94 U/L — HIGH (ref 0–41)
BASOPHILS # BLD AUTO: 0.02 K/UL — SIGNIFICANT CHANGE UP (ref 0–0.2)
BASOPHILS # BLD AUTO: 0.02 K/UL — SIGNIFICANT CHANGE UP (ref 0–0.2)
BASOPHILS NFR BLD AUTO: 0.2 % — SIGNIFICANT CHANGE UP (ref 0–1)
BASOPHILS NFR BLD AUTO: 0.2 % — SIGNIFICANT CHANGE UP (ref 0–1)
BILIRUB SERPL-MCNC: 0.6 MG/DL — SIGNIFICANT CHANGE UP (ref 0.2–1.2)
BILIRUB SERPL-MCNC: 0.6 MG/DL — SIGNIFICANT CHANGE UP (ref 0.2–1.2)
BLD GP AB SCN SERPL QL: SIGNIFICANT CHANGE UP
BLD GP AB SCN SERPL QL: SIGNIFICANT CHANGE UP
BUN SERPL-MCNC: 32 MG/DL — HIGH (ref 10–20)
BUN SERPL-MCNC: 32 MG/DL — HIGH (ref 10–20)
CALCIUM SERPL-MCNC: 9.6 MG/DL — SIGNIFICANT CHANGE UP (ref 8.4–10.5)
CALCIUM SERPL-MCNC: 9.6 MG/DL — SIGNIFICANT CHANGE UP (ref 8.4–10.5)
CHLORIDE SERPL-SCNC: 95 MMOL/L — LOW (ref 98–110)
CHLORIDE SERPL-SCNC: 95 MMOL/L — LOW (ref 98–110)
CO2 SERPL-SCNC: 28 MMOL/L — SIGNIFICANT CHANGE UP (ref 17–32)
CO2 SERPL-SCNC: 28 MMOL/L — SIGNIFICANT CHANGE UP (ref 17–32)
CREAT SERPL-MCNC: 0.9 MG/DL — SIGNIFICANT CHANGE UP (ref 0.7–1.5)
CREAT SERPL-MCNC: 0.9 MG/DL — SIGNIFICANT CHANGE UP (ref 0.7–1.5)
EGFR: 91 ML/MIN/1.73M2 — SIGNIFICANT CHANGE UP
EGFR: 91 ML/MIN/1.73M2 — SIGNIFICANT CHANGE UP
EOSINOPHIL # BLD AUTO: 0 K/UL — SIGNIFICANT CHANGE UP (ref 0–0.7)
EOSINOPHIL # BLD AUTO: 0 K/UL — SIGNIFICANT CHANGE UP (ref 0–0.7)
EOSINOPHIL NFR BLD AUTO: 0 % — SIGNIFICANT CHANGE UP (ref 0–8)
EOSINOPHIL NFR BLD AUTO: 0 % — SIGNIFICANT CHANGE UP (ref 0–8)
GLUCOSE SERPL-MCNC: 104 MG/DL — HIGH (ref 70–99)
GLUCOSE SERPL-MCNC: 104 MG/DL — HIGH (ref 70–99)
HCT VFR BLD CALC: 52 % — SIGNIFICANT CHANGE UP (ref 42–52)
HCT VFR BLD CALC: 52 % — SIGNIFICANT CHANGE UP (ref 42–52)
HGB BLD-MCNC: 16.9 G/DL — SIGNIFICANT CHANGE UP (ref 14–18)
HGB BLD-MCNC: 16.9 G/DL — SIGNIFICANT CHANGE UP (ref 14–18)
IMM GRANULOCYTES NFR BLD AUTO: 1.1 % — HIGH (ref 0.1–0.3)
IMM GRANULOCYTES NFR BLD AUTO: 1.1 % — HIGH (ref 0.1–0.3)
INR BLD: 0.96 RATIO — SIGNIFICANT CHANGE UP (ref 0.65–1.3)
INR BLD: 0.96 RATIO — SIGNIFICANT CHANGE UP (ref 0.65–1.3)
LYMPHOCYTES # BLD AUTO: 2.43 K/UL — SIGNIFICANT CHANGE UP (ref 1.2–3.4)
LYMPHOCYTES # BLD AUTO: 2.43 K/UL — SIGNIFICANT CHANGE UP (ref 1.2–3.4)
LYMPHOCYTES # BLD AUTO: 21.7 % — SIGNIFICANT CHANGE UP (ref 20.5–51.1)
LYMPHOCYTES # BLD AUTO: 21.7 % — SIGNIFICANT CHANGE UP (ref 20.5–51.1)
MCHC RBC-ENTMCNC: 29.5 PG — SIGNIFICANT CHANGE UP (ref 27–31)
MCHC RBC-ENTMCNC: 29.5 PG — SIGNIFICANT CHANGE UP (ref 27–31)
MCHC RBC-ENTMCNC: 32.5 G/DL — SIGNIFICANT CHANGE UP (ref 32–37)
MCHC RBC-ENTMCNC: 32.5 G/DL — SIGNIFICANT CHANGE UP (ref 32–37)
MCV RBC AUTO: 90.8 FL — SIGNIFICANT CHANGE UP (ref 80–94)
MCV RBC AUTO: 90.8 FL — SIGNIFICANT CHANGE UP (ref 80–94)
MONOCYTES # BLD AUTO: 0.74 K/UL — HIGH (ref 0.1–0.6)
MONOCYTES # BLD AUTO: 0.74 K/UL — HIGH (ref 0.1–0.6)
MONOCYTES NFR BLD AUTO: 6.6 % — SIGNIFICANT CHANGE UP (ref 1.7–9.3)
MONOCYTES NFR BLD AUTO: 6.6 % — SIGNIFICANT CHANGE UP (ref 1.7–9.3)
MRSA PCR RESULT.: NEGATIVE — SIGNIFICANT CHANGE UP
MRSA PCR RESULT.: NEGATIVE — SIGNIFICANT CHANGE UP
NEUTROPHILS # BLD AUTO: 7.87 K/UL — HIGH (ref 1.4–6.5)
NEUTROPHILS # BLD AUTO: 7.87 K/UL — HIGH (ref 1.4–6.5)
NEUTROPHILS NFR BLD AUTO: 70.4 % — SIGNIFICANT CHANGE UP (ref 42.2–75.2)
NEUTROPHILS NFR BLD AUTO: 70.4 % — SIGNIFICANT CHANGE UP (ref 42.2–75.2)
NRBC # BLD: 0 /100 WBCS — SIGNIFICANT CHANGE UP (ref 0–0)
NRBC # BLD: 0 /100 WBCS — SIGNIFICANT CHANGE UP (ref 0–0)
PLATELET # BLD AUTO: 227 K/UL — SIGNIFICANT CHANGE UP (ref 130–400)
PLATELET # BLD AUTO: 227 K/UL — SIGNIFICANT CHANGE UP (ref 130–400)
PMV BLD: 11.2 FL — HIGH (ref 7.4–10.4)
PMV BLD: 11.2 FL — HIGH (ref 7.4–10.4)
POTASSIUM SERPL-MCNC: 4.7 MMOL/L — SIGNIFICANT CHANGE UP (ref 3.5–5)
POTASSIUM SERPL-MCNC: 4.7 MMOL/L — SIGNIFICANT CHANGE UP (ref 3.5–5)
POTASSIUM SERPL-SCNC: 4.7 MMOL/L — SIGNIFICANT CHANGE UP (ref 3.5–5)
POTASSIUM SERPL-SCNC: 4.7 MMOL/L — SIGNIFICANT CHANGE UP (ref 3.5–5)
PROT SERPL-MCNC: 7.2 G/DL — SIGNIFICANT CHANGE UP (ref 6–8)
PROT SERPL-MCNC: 7.2 G/DL — SIGNIFICANT CHANGE UP (ref 6–8)
PROTHROM AB SERPL-ACNC: 10.9 SEC — SIGNIFICANT CHANGE UP (ref 9.95–12.87)
PROTHROM AB SERPL-ACNC: 10.9 SEC — SIGNIFICANT CHANGE UP (ref 9.95–12.87)
RBC # BLD: 5.73 M/UL — SIGNIFICANT CHANGE UP (ref 4.7–6.1)
RBC # BLD: 5.73 M/UL — SIGNIFICANT CHANGE UP (ref 4.7–6.1)
RBC # FLD: 14.1 % — SIGNIFICANT CHANGE UP (ref 11.5–14.5)
RBC # FLD: 14.1 % — SIGNIFICANT CHANGE UP (ref 11.5–14.5)
SODIUM SERPL-SCNC: 135 MMOL/L — SIGNIFICANT CHANGE UP (ref 135–146)
SODIUM SERPL-SCNC: 135 MMOL/L — SIGNIFICANT CHANGE UP (ref 135–146)
T4 AB SER-ACNC: 8.6 UG/DL — SIGNIFICANT CHANGE UP (ref 4.6–12)
T4 AB SER-ACNC: 8.6 UG/DL — SIGNIFICANT CHANGE UP (ref 4.6–12)
TSH SERPL-MCNC: 0.48 UIU/ML — SIGNIFICANT CHANGE UP (ref 0.27–4.2)
TSH SERPL-MCNC: 0.48 UIU/ML — SIGNIFICANT CHANGE UP (ref 0.27–4.2)
WBC # BLD: 11.18 K/UL — HIGH (ref 4.8–10.8)
WBC # BLD: 11.18 K/UL — HIGH (ref 4.8–10.8)
WBC # FLD AUTO: 11.18 K/UL — HIGH (ref 4.8–10.8)
WBC # FLD AUTO: 11.18 K/UL — HIGH (ref 4.8–10.8)

## 2024-01-10 PROCEDURE — 85025 COMPLETE CBC W/AUTO DIFF WBC: CPT

## 2024-01-10 PROCEDURE — 80053 COMPREHEN METABOLIC PANEL: CPT

## 2024-01-10 PROCEDURE — 99214 OFFICE O/P EST MOD 30 MIN: CPT | Mod: 25

## 2024-01-10 PROCEDURE — 93010 ELECTROCARDIOGRAM REPORT: CPT

## 2024-01-10 PROCEDURE — 93005 ELECTROCARDIOGRAM TRACING: CPT

## 2024-01-10 PROCEDURE — 86850 RBC ANTIBODY SCREEN: CPT

## 2024-01-10 PROCEDURE — 87640 STAPH A DNA AMP PROBE: CPT

## 2024-01-10 PROCEDURE — 86900 BLOOD TYPING SEROLOGIC ABO: CPT

## 2024-01-10 PROCEDURE — 84443 ASSAY THYROID STIM HORMONE: CPT

## 2024-01-10 PROCEDURE — 84436 ASSAY OF TOTAL THYROXINE: CPT

## 2024-01-10 PROCEDURE — 85610 PROTHROMBIN TIME: CPT

## 2024-01-10 PROCEDURE — 87641 MR-STAPH DNA AMP PROBE: CPT

## 2024-01-10 PROCEDURE — 86901 BLOOD TYPING SEROLOGIC RH(D): CPT

## 2024-01-10 PROCEDURE — 36415 COLL VENOUS BLD VENIPUNCTURE: CPT

## 2024-01-10 PROCEDURE — 85730 THROMBOPLASTIN TIME PARTIAL: CPT

## 2024-01-10 NOTE — H&P PST ADULT - REASON FOR ADMISSION
Case Type: OP   Suite: [facility]  Proceduralist: Rishi Mejia  Confirmed Surgery Date Time: 01-   PAST Date Time: 01- - 6:15  Procedure: PLACEMENT OF PERCUTANEOUS SPINAL CORD STIMULATOR  Laterality: N/A  Length of Procedure: 120 Minutes  Anesthesia Type: General Case Type: OP   Suite: [facility]  Proceduralist: Rishi Mejia  Confirmed Surgery Date Time: 01-   PAST Date Time: 01- - 6:15  Procedure: PLACEMENT OF PERCUTANEOUS SPINAL CORD STIMULATOR  Laterality: N/A  Length of Procedure: 120 Minutes  Anesthesia Type: General

## 2024-01-10 NOTE — H&P PST ADULT - NSICDXPASTMEDICALHX_GEN_ALL_CORE_FT
PAST MEDICAL HISTORY:  Depression     Fall with injury     H/O headache     History of trigeminal neuralgia     History of trigeminal neuralgia     HLD (hyperlipidemia)     HTN (hypertension)     Hypothyroidism     Status post thyroid surgery     Tremors of nervous system

## 2024-01-10 NOTE — H&P PST ADULT - HISTORY OF PRESENT ILLNESS
[place] is a 72 yo male with PMH of HTN, HLD, Trigeminal neuralgia with s/p surgery 01/2023, Fall from a 20 foot plate form on 2014 with fractured cervical C3-C7, s/p fusion and Lumbar injury with s/p  fusion L3-L4-5 who presents to Pretesting for the above surgery due to Sharp back pain scale 7-8/10 radiating to left back to knee and improvements noted with trial of Medtronic spinal stomulator.   Patient denies any cp, sob, palpitations, fever, cough, abdominal pains, N/V, UTI, Rashes or open wounds. Patient is on antibiotics ( Augmentin  for URI x 7 days   As per patient exercise tolerance of 1 fos walks with out sob except back pains. Patient has slight limping. Ambulating with cane at times.   Patient had Covid x 1  Patient denies any s/s covid 19 and reports no contact with known positive people. Patient  instructed to continue to self monitor and report any concerns to MD. Pt will continue to practice self isolation and  exposure control measures pre op.  Anesthesia Alert  NO--Difficult Airway  YES--History of neck surgery or radiation  YES--Limited ROM of neck. Requesting neck cushion during surgery  NO--History of Malignant hyperthermia  NO--Personal or family history of Pseudocholinesterase deficiency  NO--Prior Anesthesia Complication  NO--Latex Allergy  NO--Loose teeth. 3 Front teeth implants   NO--History of Rheumatoid Arthritis  NO--LIZ  NO--Risk of Bleedings   Pt instructed to stop vitamins/supplements/herbal medications for one week prior to surgery  As per patient this is the complete medical, surgical history and medications.  Duke Activity Status Index (DASI) from [employer]  on 1/10/2024  ** All calculations should be rechecked by clinician prior to use **  RESULT SUMMARY:  19.45 points  The higher the score (maximum 58.2), the higher the functional status.  5.13 METs  INPUTS:  Take care of self —> 2.75 = Yes  Walk indoors —> 1.75 = Yes  Walk 1&ndash;2 blocks on level ground —> 2.75 = Yes  Climb a flight of stairs or walk up a hill —> 0 = No  Run a short distance —> 0 = No  Do light work around the house —> 2.7 = Yes  Do moderate work around the house —> 3.5 = Yes  Do heavy work around the house —> 0 = No  Do yardwork —> 0 = No  Have sexual relations —> 0 = No  Participate in moderate recreational activities —> 6 = Yes  Participate in strenuous sports —> 0 = No  Revised Cardiac Risk Index for Pre-Operative Risk from [email or web address]  on 1/10/2024  ** All calculations should be rechecked by clinician prior to use **    RESULT SUMMARY:  1 points  Class II Risk  6.0 %  30-day risk of death, MI, or cardiac arrest  From [employer] 2017. These numbers are higher than those from the original study (Marshall 1999). See Evidence for details.  INPUTS:  Elevated-risk surgery —> 1 = Yes  History of ischemic heart disease —> 0 = No  History of congestive heart failure —> 0 = No  History of cerebrovascular disease —> 0 = No  Pre-operative treatment with insulin —> 0 = No  Pre-operative creatinine >2 mg/dL / 176.8 µmol/L —> 0 = No     Mu Bay is a 72 yo male with PMH of HTN, HLD, Trigeminal neuralgia with s/p surgery 01/2023, Fall from a 20 foot plate form on 2014 with fractured cervical C3-C7, s/p fusion and Lumbar injury with s/p  fusion L3-L4-5 who presents to Pretesting for the above surgery due to Sharp back pain scale 7-8/10 radiating to left back to knee and improvements noted with trial of Medtronic spinal stomulator.   Patient denies any cp, sob, palpitations, fever, cough, abdominal pains, N/V, UTI, Rashes or open wounds. Patient is on antibiotics ( Augmentin  for URI x 7 days   As per patient exercise tolerance of 1 fos walks with out sob except back pains. Patient has slight limping. Ambulating with cane at times.   Patient had Covid x 1  Patient denies any s/s covid 19 and reports no contact with known positive people. Patient  instructed to continue to self monitor and report any concerns to MD. Pt will continue to practice self isolation and  exposure control measures pre op.  Anesthesia Alert  NO--Difficult Airway  YES--History of neck surgery or radiation  YES--Limited ROM of neck. Requesting neck cushion during surgery  NO--History of Malignant hyperthermia  NO--Personal or family history of Pseudocholinesterase deficiency  NO--Prior Anesthesia Complication  NO--Latex Allergy  NO--Loose teeth. 3 Front teeth implants   NO--History of Rheumatoid Arthritis  NO--LIZ  NO--Risk of Bleedings   Pt instructed to stop vitamins/supplements/herbal medications for one week prior to surgery  As per patient this is the complete medical, surgical history and medications.  Duke Activity Status Index (DASI) from [employer]  on 1/10/2024  ** All calculations should be rechecked by clinician prior to use **  RESULT SUMMARY:  19.45 points  The higher the score (maximum 58.2), the higher the functional status.  5.13 METs  INPUTS:  Take care of self —> 2.75 = Yes  Walk indoors —> 1.75 = Yes  Walk 1&ndash;2 blocks on level ground —> 2.75 = Yes  Climb a flight of stairs or walk up a hill —> 0 = No  Run a short distance —> 0 = No  Do light work around the house —> 2.7 = Yes  Do moderate work around the house —> 3.5 = Yes  Do heavy work around the house —> 0 = No  Do yardwork —> 0 = No  Have sexual relations —> 0 = No  Participate in moderate recreational activities —> 6 = Yes  Participate in strenuous sports —> 0 = No  Revised Cardiac Risk Index for Pre-Operative Risk from [email or web address]  on 1/10/2024  ** All calculations should be rechecked by clinician prior to use **    RESULT SUMMARY:  1 points  Class II Risk  6.0 %  30-day risk of death, MI, or cardiac arrest  From [employer] 2017. These numbers are higher than those from the original study (Marshall 1999). See Evidence for details.  INPUTS:  Elevated-risk surgery —> 1 = Yes  History of ischemic heart disease —> 0 = No  History of congestive heart failure —> 0 = No  History of cerebrovascular disease —> 0 = No  Pre-operative treatment with insulin —> 0 = No  Pre-operative creatinine >2 mg/dL / 176.8 µmol/L —> 0 = No

## 2024-01-10 NOTE — H&P PST ADULT - NSICDXPASTSURGICALHX_GEN_ALL_CORE_FT
PAST SURGICAL HISTORY:  S/P cervical spinal fusion     S/P lumbar fusion     Trigeminal neuralgia

## 2024-01-10 NOTE — H&P PST ADULT - NSICDXFAMILYHX_GEN_ALL_CORE_FT
FAMILY HISTORY:  Sibling  Still living? No  Family history of embolic stroke, Age at diagnosis: Age Unknown  FH: heart attack, Age at diagnosis: Age Unknown

## 2024-01-11 DIAGNOSIS — M96.1 POSTLAMINECTOMY SYNDROME, NOT ELSEWHERE CLASSIFIED: ICD-10-CM

## 2024-01-11 DIAGNOSIS — Z01.818 ENCOUNTER FOR OTHER PREPROCEDURAL EXAMINATION: ICD-10-CM
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
## 2024-01-12 PROBLEM — Z86.69 PERSONAL HISTORY OF OTHER DISEASES OF THE NERVOUS SYSTEM AND SENSE ORGANS: Chronic | Status: ACTIVE | Noted: 2024-01-10

## 2024-01-12 PROBLEM — F32.A DEPRESSION, UNSPECIFIED: Chronic | Status: ACTIVE | Noted: 2024-01-10

## 2024-01-12 PROBLEM — Z98.890 OTHER SPECIFIED POSTPROCEDURAL STATES: Chronic | Status: ACTIVE | Noted: 2024-01-10

## 2024-01-12 PROBLEM — E78.5 HYPERLIPIDEMIA, UNSPECIFIED: Chronic | Status: ACTIVE | Noted: 2024-01-10

## 2024-01-12 PROBLEM — E03.9 HYPOTHYROIDISM, UNSPECIFIED: Chronic | Status: ACTIVE | Noted: 2024-01-10

## 2024-01-12 PROBLEM — I10 ESSENTIAL (PRIMARY) HYPERTENSION: Chronic | Status: ACTIVE | Noted: 2024-01-10

## 2024-01-12 PROBLEM — Z87.898 PERSONAL HISTORY OF OTHER SPECIFIED CONDITIONS: Chronic | Status: ACTIVE | Noted: 2024-01-10

## 2024-01-12 PROBLEM — R25.1 TREMOR, UNSPECIFIED: Chronic | Status: ACTIVE | Noted: 2024-01-10

## 2024-01-22 ENCOUNTER — OUTPATIENT (OUTPATIENT)
Dept: OUTPATIENT SERVICES | Facility: HOSPITAL | Age: 74
LOS: 1 days | Discharge: HOME | End: 2024-01-22
Payer: COMMERCIAL

## 2024-01-22 ENCOUNTER — APPOINTMENT (OUTPATIENT)
Dept: NEUROSURGERY | Facility: HOSPITAL | Age: 74
End: 2024-01-22

## 2024-01-22 ENCOUNTER — TRANSCRIPTION ENCOUNTER (OUTPATIENT)
Age: 74
End: 2024-01-22

## 2024-01-22 VITALS
DIASTOLIC BLOOD PRESSURE: 80 MMHG | SYSTOLIC BLOOD PRESSURE: 155 MMHG | RESPIRATION RATE: 17 BRPM | OXYGEN SATURATION: 97 % | HEART RATE: 58 BPM

## 2024-01-22 VITALS
DIASTOLIC BLOOD PRESSURE: 63 MMHG | WEIGHT: 240.08 LBS | OXYGEN SATURATION: 96 % | RESPIRATION RATE: 16 BRPM | TEMPERATURE: 98 F | SYSTOLIC BLOOD PRESSURE: 114 MMHG | HEIGHT: 72 IN | HEART RATE: 58 BPM

## 2024-01-22 DIAGNOSIS — G89.4 CHRONIC PAIN SYNDROME: ICD-10-CM

## 2024-01-22 DIAGNOSIS — M54.16 RADICULOPATHY, LUMBAR REGION: ICD-10-CM

## 2024-01-22 DIAGNOSIS — Z98.1 ARTHRODESIS STATUS: Chronic | ICD-10-CM

## 2024-01-22 DIAGNOSIS — G50.0 TRIGEMINAL NEURALGIA: Chronic | ICD-10-CM

## 2024-01-22 DIAGNOSIS — Z98.1 ARTHRODESIS STATUS: ICD-10-CM

## 2024-01-22 DIAGNOSIS — M96.1 POSTLAMINECTOMY SYNDROME, NOT ELSEWHERE CLASSIFIED: ICD-10-CM

## 2024-01-22 DIAGNOSIS — Z91.041 RADIOGRAPHIC DYE ALLERGY STATUS: ICD-10-CM

## 2024-01-22 PROCEDURE — C1778: CPT

## 2024-01-22 PROCEDURE — C1787: CPT

## 2024-01-22 PROCEDURE — C1820: CPT

## 2024-01-22 PROCEDURE — 63650 IMPLANT NEUROELECTRODES: CPT

## 2024-01-22 PROCEDURE — C1889: CPT

## 2024-01-22 PROCEDURE — 63685 INS/RPLC SPI NPG/RCVR POCKET: CPT

## 2024-01-22 PROCEDURE — 72020 X-RAY EXAM OF SPINE 1 VIEW: CPT

## 2024-01-22 RX ORDER — GABAPENTIN 400 MG/1
1 CAPSULE ORAL
Refills: 0 | DISCHARGE

## 2024-01-22 RX ORDER — ONDANSETRON 8 MG/1
4 TABLET, FILM COATED ORAL ONCE
Refills: 0 | Status: DISCONTINUED | OUTPATIENT
Start: 2024-01-22 | End: 2024-01-22

## 2024-01-22 RX ORDER — PROPRANOLOL HCL 160 MG
1 CAPSULE, EXTENDED RELEASE 24HR ORAL
Refills: 0 | DISCHARGE

## 2024-01-22 RX ORDER — CHOLECALCIFEROL (VITAMIN D3) 125 MCG
1 CAPSULE ORAL
Refills: 0 | DISCHARGE

## 2024-01-22 RX ORDER — HYDROCHLOROTHIAZIDE 25 MG
1 TABLET ORAL
Refills: 0 | DISCHARGE

## 2024-01-22 RX ORDER — RASAGILINE 0.5 MG/1
1 TABLET ORAL
Refills: 0 | DISCHARGE

## 2024-01-22 RX ORDER — APREPITANT 80 MG/1
40 CAPSULE ORAL ONCE
Refills: 0 | Status: COMPLETED | OUTPATIENT
Start: 2024-01-22 | End: 2024-01-22

## 2024-01-22 RX ORDER — CYCLOSPORINE 0.5 MG/ML
1 EMULSION OPHTHALMIC
Refills: 0 | DISCHARGE

## 2024-01-22 RX ORDER — METHOCARBAMOL 500 MG/1
1 TABLET, FILM COATED ORAL
Qty: 56 | Refills: 0
Start: 2024-01-22 | End: 2024-02-04

## 2024-01-22 RX ORDER — HYDROMORPHONE HYDROCHLORIDE 2 MG/ML
1 INJECTION INTRAMUSCULAR; INTRAVENOUS; SUBCUTANEOUS
Refills: 0 | Status: DISCONTINUED | OUTPATIENT
Start: 2024-01-22 | End: 2024-01-22

## 2024-01-22 RX ORDER — DULOXETINE HYDROCHLORIDE 30 MG/1
1 CAPSULE, DELAYED RELEASE ORAL
Refills: 0 | DISCHARGE

## 2024-01-22 RX ORDER — OXYCODONE HYDROCHLORIDE 5 MG/1
1 TABLET ORAL
Qty: 28 | Refills: 0
Start: 2024-01-22 | End: 2024-01-28

## 2024-01-22 RX ORDER — DONEPEZIL HYDROCHLORIDE 10 MG/1
1 TABLET, FILM COATED ORAL
Refills: 0 | DISCHARGE

## 2024-01-22 RX ORDER — ROSUVASTATIN CALCIUM 5 MG/1
1 TABLET ORAL
Refills: 0 | DISCHARGE

## 2024-01-22 RX ORDER — ALPRAZOLAM 0.25 MG
2 TABLET ORAL
Refills: 0 | DISCHARGE

## 2024-01-22 RX ORDER — LEVOTHYROXINE SODIUM 125 MCG
1 TABLET ORAL
Refills: 0 | DISCHARGE

## 2024-01-22 RX ORDER — CELECOXIB 200 MG/1
200 CAPSULE ORAL ONCE
Refills: 0 | Status: COMPLETED | OUTPATIENT
Start: 2024-01-22 | End: 2024-01-22

## 2024-01-22 RX ORDER — HYDROMORPHONE HYDROCHLORIDE 2 MG/ML
0.5 INJECTION INTRAMUSCULAR; INTRAVENOUS; SUBCUTANEOUS
Refills: 0 | Status: DISCONTINUED | OUTPATIENT
Start: 2024-01-22 | End: 2024-01-22

## 2024-01-22 RX ADMIN — APREPITANT 40 MILLIGRAM(S): 80 CAPSULE ORAL at 10:14

## 2024-01-22 RX ADMIN — CELECOXIB 200 MILLIGRAM(S): 200 CAPSULE ORAL at 10:17

## 2024-01-22 RX ADMIN — CELECOXIB 200 MILLIGRAM(S): 200 CAPSULE ORAL at 10:13

## 2024-01-22 NOTE — ASU DISCHARGE PLAN (ADULT/PEDIATRIC) - NS MD DC FALL RISK RISK
For information on Fall & Injury Prevention, visit: [email or web address] OR  [email or web address] OR  https://www.cdc.gov/steadi/patient.html

## 2024-01-22 NOTE — ASU PREOP CHECKLIST - NS ASU TO WHOM HOLDING TO OR
dm Cyclophosphamide Pregnancy And Lactation Text: This medication is Pregnancy Category D and it isn't considered safe during pregnancy. This medication is excreted in breast milk.

## 2024-01-22 NOTE — ASU DISCHARGE PLAN (ADULT/PEDIATRIC) - CARE PROVIDER_API CALL
Rishi Mejia  Neurosurgery  [address] [place] [address], [address]  [place] [address]  Phone: [phone]  Fax: [phone]  Follow Up Time:

## 2024-01-22 NOTE — ASU PATIENT PROFILE, ADULT - FALL HARM RISK - HARM RISK INTERVENTIONS

## 2024-01-22 NOTE — CHART NOTE - NSCHARTNOTEFT_GEN_A_CORE
PACU ANESTHESIA ADMISSION NOTE      Procedure: Placement of percutaneous spinal cord stimulator  Post op diagnosis:  Chronic back pain     ____  Intubated  TV:______       Rate: ______      FiO2: ______    _x___  Patent Airway    _x___  Full return of protective reflexes    _x___  Full recovery from anesthesia / back to baseline status    Vitals:  T(F): 98.1   HR: 54  BP: 161/93  RR: 17  SpO2: 97%     Mental Status:  _x___ Awake   __x___ Alert   _____ Drowsy   _____ Sedated    Nausea/Vomiting:  _x___  NO       ______Yes,   See Post - Op Orders         Pain Scale (0-10):  __0___    Treatment: _x___ None    ____ See Post - Op/PCA Orders    Post - Operative Fluids:   __x__ Oral   ____ See Post - Op Orders    Plan: Discharge:   _x___Home       _____Floor     _____Critical Care    _____  Other:_________________    Comments:  No anesthesia issues or complications noted.  Discharge when criteria met.

## 2024-01-22 NOTE — ASU PREOP CHECKLIST - ANTIBIOTIC
n/a 88yo 68kg m w pmh htn, hld, cad s/p pci w stent, sah, gerd, depression, metastatic prostate ca, p/w abdominal pain (x1 week in duration, over lower quadrants and suprapubic region, non radiating, 7-8/10 in intensity; a/w constipation, hematochezia, dysuria; had been seen at [facility] er and by outpatient provider; was diagnosed with proctitis and uti and started on po abx with no relief in symptoms; so had ct imaging done of abdo/pelv which showed findings suggestive of proctitis w rectal perforation + ascending uti; sent to er and admitted to medicine for further mgmt.

## 2024-02-07 ENCOUNTER — APPOINTMENT (OUTPATIENT)
Dept: NEUROSURGERY | Facility: CLINIC | Age: 74
End: 2024-02-07
Payer: OTHER MISCELLANEOUS

## 2024-02-07 VITALS — HEIGHT: 69 IN | BODY MASS INDEX: 35.55 KG/M2 | WEIGHT: 240 LBS

## 2024-02-07 DIAGNOSIS — Z96.89 PRESENCE OF OTHER SPECIFIED FUNCTIONAL IMPLANTS: ICD-10-CM

## 2024-02-07 PROCEDURE — 99214 OFFICE O/P EST MOD 30 MIN: CPT

## 2024-02-07 NOTE — HISTORY OF PRESENT ILLNESS
[FreeTextEntry1] : Mr. LANG is a pleasant 73-year-old male presenting to the neurosurgery office today status post placement of a percutaneous spinal cord stimulator on 1/22/2024.   He is overall doing well endorsing that much of his low back pain has resolved but mild residual pain is noted in the anterior left thigh. He has "good days and bad days." Patient is ambulating steadily without any assistive devices. He was further reprogrammed today along with the representative, Abhi, from [employer] under supervision. He is now on a tonic program and notes positive coverage. No concerns were identified.   No new imaging for review today.   The two surgical sites were assessed and were negative for any drainage or dehiscence, were clean, dry, and intact. Sutures were removed without difficulty and site was cleansed.   Patient will return to the office in one month for another follow-up.

## 2024-02-07 NOTE — ASSESSMENT
[FreeTextEntry1] : 74yo M presenting to the neurosurgery office today status post placement of a percutaneous spinal cord stimulator on 1/22/2024. Overall improvement noted with residual LLE radiculopathy. Reprogramming was performed today along with the representative. Patient will return in one month, sooner if needed.   No concerns were identified during the visit and we thank Mr. LANG for allowing us to be a part of his care team   I personally reviewed with the NP, this patient's history and physical exam findings, as documented above. I have discussed the relevant areas of concern, having direct implications to the presenting problems and illnesses, and I have personally examined all pertinent and positive and negative findings, which impact their neurosurgical treatment.

## 2024-04-15 ENCOUNTER — RX RENEWAL (OUTPATIENT)
Age: 74
End: 2024-04-15

## 2024-04-15 RX ORDER — ROSUVASTATIN CALCIUM 40 MG/1
40 TABLET, FILM COATED ORAL
Qty: 90 | Refills: 0 | Status: ACTIVE | COMMUNITY
Start: 2022-01-17 | End: 1900-01-01

## 2024-06-17 ENCOUNTER — APPOINTMENT (OUTPATIENT)
Dept: NEUROSURGERY | Facility: CLINIC | Age: 74
End: 2024-06-17
Payer: OTHER MISCELLANEOUS

## 2024-06-17 VITALS — HEIGHT: 69 IN | WEIGHT: 233 LBS | BODY MASS INDEX: 34.51 KG/M2

## 2024-06-17 PROCEDURE — 99214 OFFICE O/P EST MOD 30 MIN: CPT

## 2024-06-21 ENCOUNTER — APPOINTMENT (OUTPATIENT)
Dept: NEUROSURGERY | Facility: CLINIC | Age: 74
End: 2024-06-21
Payer: COMMERCIAL

## 2024-06-21 VITALS
WEIGHT: 233 LBS | HEIGHT: 69 IN | SYSTOLIC BLOOD PRESSURE: 120 MMHG | DIASTOLIC BLOOD PRESSURE: 72 MMHG | BODY MASS INDEX: 34.51 KG/M2

## 2024-06-21 DIAGNOSIS — G50.0 TRIGEMINAL NEURALGIA: ICD-10-CM

## 2024-06-21 PROCEDURE — 99213 OFFICE O/P EST LOW 20 MIN: CPT

## 2024-06-21 RX ORDER — CARBAMAZEPINE 100 MG/1
100 CAPSULE, EXTENDED RELEASE ORAL
Qty: 60 | Refills: 0 | Status: DISCONTINUED | COMMUNITY
Start: 2024-06-21 | End: 2024-06-21

## 2024-06-24 NOTE — ASSESSMENT
[FreeTextEntry1] : Mr. LANG is a pleasant 73-year-old male presenting to the neurosurgery office today status post placement of a percutaneous spinal cord stimulator on 1/22/2024. Doing well overall and resolution of pain.   PLAN -Refer to Dr. Richard for Steriotactic Radiosurgery for trigeminal neuralgia -Encouraged to call with any questions or concerns -Return CARLOSN   TASNEEM Kauffman-BC Nurse Practitioner [facility]   Rishi Mejia MD [facility] , Department of Neurosurgery [facility].

## 2024-06-24 NOTE — HISTORY OF PRESENT ILLNESS
[FreeTextEntry1] : Mr. LANG is a pleasant 73-year-old male presenting to the neurosurgery office today status post placement of a percutaneous spinal cord stimulator on 1/22/2024.  He is overall doing well reporting that much of his low back pain has resolved. Patient is ambulating steadily without any assistive devices. He was further reprogrammed today along with the representative, from [employer] under supervision. No concerns were identified. SCS site assessed, and skin is clean, dry, and intact.  Patient mentioning persistent facial pain and numbness, hx of trigeminal neuralgia and underwent right SOC for MVD with Dr. Palencia. he has residual V1/V2 numbness but no pain.  Patient will return to the office PRN

## 2024-06-26 NOTE — ADDENDUM
[FreeTextEntry1] : Pharmacy contacted the office with concern that the Carbamazepine interacts with his maintenance medication filled at a different pharmacy. Patient made aware to contact the PCP for further medication management. Carbamazepine voided.

## 2024-06-26 NOTE — END OF VISIT
[FreeTextEntry3] :  I, Dr. Richard, personally performed the evaluation and management (E/M) services for this new patient.  That E/M includes conducting the initial examination, assessing all conditions, and establishing the plan of care.  Today, my JEROD, was here to observe my evaluation and management services for this patient to be followed going forward.

## 2024-06-26 NOTE — HISTORY OF PRESENT ILLNESS
[FreeTextEntry1] : Mr. LANG is a pleasant 74-year-old male presenting to the office today, referred by Dr. Mejia, to discuss a possible plan for his trigeminal neuralgia.   Summary: The patient is experiencing persistent numbness and pain in the face and mouth area after undergoing microvascular decompression surgery for trigeminal neuralgia in 2022 with Dr. Palencia. The symptoms are different from the pre-operative trigeminal neuralgia pain and include constant numbness, occasional pain that can escalate to sharp, electric-like sensations, and sensitivity to environmental factors like humidity and cold air. 10/10 prior to surgery, 0/10 after surgery but 7/10 today with persistent numbness.   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  Treatment Plan: - Evaluation for potential Gamma Knife radiosurgery or glycerol injection procedure to target the affected trigeminal nerve however patient has undergone glycerol injection in the past finding no relief. - Consideration of other procedures like radiofrequency ablation - Mr. LANG is going to begin a low dose of Carbamazepine ER 100mg PO BID x1 month, after which we will reconvene to assess his status. If the medication alleviates any of his symptoms, it is hopeful that stereotactic radiation will work in the future.   All questions answered today and the patient was grateful for the visit.

## 2024-06-26 NOTE — ASSESSMENT
[FreeTextEntry1] : Mr. LANG is status post MVD in 2022 for R [place]. The patient's symptoms are unusual after microvascular decompression surgery and so further evaluation and appropriate treatment plan are still pending.  Patient to begin Carbamazepine ER 100mg PO BID x1 month, after which we will reconvene to assess his status. If the medication alleviates any of his symptoms, it is hopeful that stereotactic radiation will work in the future.   Eileen Foss MS, FNP-BC Quinten Richard MD, CS

## 2024-07-09 ENCOUNTER — APPOINTMENT (OUTPATIENT)
Dept: HEART AND VASCULAR | Facility: CLINIC | Age: 74
End: 2024-07-09
Payer: COMMERCIAL

## 2024-07-09 VITALS
HEIGHT: 69 IN | WEIGHT: 235 LBS | TEMPERATURE: 96 F | OXYGEN SATURATION: 95 % | BODY MASS INDEX: 34.8 KG/M2 | HEART RATE: 106 BPM | DIASTOLIC BLOOD PRESSURE: 88 MMHG | SYSTOLIC BLOOD PRESSURE: 142 MMHG

## 2024-07-09 VITALS — SYSTOLIC BLOOD PRESSURE: 120 MMHG | DIASTOLIC BLOOD PRESSURE: 70 MMHG

## 2024-07-09 DIAGNOSIS — I35.1 NONRHEUMATIC AORTIC (VALVE) INSUFFICIENCY: ICD-10-CM

## 2024-07-09 DIAGNOSIS — R07.89 OTHER CHEST PAIN: ICD-10-CM

## 2024-07-09 DIAGNOSIS — I45.10 UNSPECIFIED RIGHT BUNDLE-BRANCH BLOCK: ICD-10-CM

## 2024-07-09 DIAGNOSIS — I49.3 VENTRICULAR PREMATURE DEPOLARIZATION: ICD-10-CM

## 2024-07-09 DIAGNOSIS — E78.5 HYPERLIPIDEMIA, UNSPECIFIED: ICD-10-CM

## 2024-07-09 DIAGNOSIS — I10 ESSENTIAL (PRIMARY) HYPERTENSION: ICD-10-CM

## 2024-07-09 PROCEDURE — 99214 OFFICE O/P EST MOD 30 MIN: CPT | Mod: 25

## 2024-07-09 PROCEDURE — 93306 TTE W/DOPPLER COMPLETE: CPT

## 2024-07-09 PROCEDURE — 36415 COLL VENOUS BLD VENIPUNCTURE: CPT

## 2024-07-09 PROCEDURE — 93000 ELECTROCARDIOGRAM COMPLETE: CPT

## 2024-07-15 ENCOUNTER — RX RENEWAL (OUTPATIENT)
Age: 74
End: 2024-07-15

## 2024-07-18 DIAGNOSIS — I47.10 SUPRAVENTRICULAR TACHYCARDIA, UNSPECIFIED: ICD-10-CM

## 2024-07-18 RX ORDER — METOPROLOL SUCCINATE 100 MG/1
100 TABLET, EXTENDED RELEASE ORAL DAILY
Qty: 1 | Refills: 1 | Status: ACTIVE | COMMUNITY
Start: 2024-07-18 | End: 1900-01-01

## 2024-07-31 ENCOUNTER — APPOINTMENT (OUTPATIENT)
Dept: CARDIOLOGY | Facility: CLINIC | Age: 74
End: 2024-07-31
Payer: COMMERCIAL

## 2024-07-31 PROCEDURE — A9500: CPT

## 2024-07-31 PROCEDURE — 78452 HT MUSCLE IMAGE SPECT MULT: CPT

## 2024-07-31 PROCEDURE — 93015 CV STRESS TEST SUPVJ I&R: CPT

## 2024-08-02 ENCOUNTER — APPOINTMENT (OUTPATIENT)
Dept: NEUROSURGERY | Facility: CLINIC | Age: 74
End: 2024-08-02

## 2024-08-27 ENCOUNTER — NON-APPOINTMENT (OUTPATIENT)
Age: 74
End: 2024-08-27

## 2024-08-28 ENCOUNTER — RX RENEWAL (OUTPATIENT)
Age: 74
End: 2024-08-28

## 2024-08-28 ENCOUNTER — NON-APPOINTMENT (OUTPATIENT)
Age: 74
End: 2024-08-28

## 2024-08-28 ENCOUNTER — APPOINTMENT (OUTPATIENT)
Dept: HEART AND VASCULAR | Facility: CLINIC | Age: 74
End: 2024-08-28
Payer: COMMERCIAL

## 2024-08-28 VITALS
OXYGEN SATURATION: 96 % | DIASTOLIC BLOOD PRESSURE: 84 MMHG | TEMPERATURE: 97.3 F | WEIGHT: 234 LBS | HEART RATE: 76 BPM | BODY MASS INDEX: 34.66 KG/M2 | HEIGHT: 69 IN | SYSTOLIC BLOOD PRESSURE: 126 MMHG

## 2024-08-28 DIAGNOSIS — E78.5 HYPERLIPIDEMIA, UNSPECIFIED: ICD-10-CM

## 2024-08-28 DIAGNOSIS — I49.3 VENTRICULAR PREMATURE DEPOLARIZATION: ICD-10-CM

## 2024-08-28 DIAGNOSIS — I10 ESSENTIAL (PRIMARY) HYPERTENSION: ICD-10-CM

## 2024-08-28 PROCEDURE — G2211 COMPLEX E/M VISIT ADD ON: CPT | Mod: NC

## 2024-08-28 PROCEDURE — 99214 OFFICE O/P EST MOD 30 MIN: CPT | Mod: 25

## 2024-08-28 PROCEDURE — 93000 ELECTROCARDIOGRAM COMPLETE: CPT

## 2024-08-28 RX ORDER — METOPROLOL SUCCINATE 25 MG/1
25 TABLET, EXTENDED RELEASE ORAL DAILY
Qty: 90 | Refills: 0 | Status: ACTIVE | COMMUNITY
Start: 2024-08-28 | End: 1900-01-01

## 2024-08-28 NOTE — DISCUSSION/SUMMARY
[FreeTextEntry1] : EKG:NSR.IRBBB continue hctz fro HPTN; crestor + zetia for HLD; would give very low dose of BB for PVC

## 2024-08-28 NOTE — PHYSICAL EXAM
[Well Developed] : well developed [Well Nourished] : well nourished [No Acute Distress] : no acute distress [Normal Conjunctiva] : normal conjunctiva [Normal Venous Pressure] : normal venous pressure [No Carotid Bruit] : no carotid bruit [Normal S1, S2] : normal S1, S2 [No Murmur] : no murmur [No Rub] : no rub [No Gallop] : no gallop [Clear Lung Fields] : clear lung fields [Good Air Entry] : good air entry [No Respiratory Distress] : no respiratory distress  [Soft] : abdomen soft [Non Tender] : non-tender [Normal Bowel Sounds] : normal bowel sounds [Normal Gait] : normal gait [No Edema] : no edema [No Cyanosis] : no cyanosis [No Clubbing] : no clubbing [No Varicosities] : no varicosities [No Rash] : no rash [Moves all extremities] : moves all extremities [Normal Speech] : normal speech [Alert and Oriented] : alert and oriented [Normal memory] : normal memory [de-identified] : O/W [de-identified] : + ectopics

## 2024-10-28 ENCOUNTER — RX RENEWAL (OUTPATIENT)
Age: 74
End: 2024-10-28

## 2024-11-13 ENCOUNTER — APPOINTMENT (OUTPATIENT)
Dept: HEART AND VASCULAR | Facility: CLINIC | Age: 74
End: 2024-11-13

## 2024-11-13 ENCOUNTER — NON-APPOINTMENT (OUTPATIENT)
Age: 74
End: 2024-11-13

## 2024-11-13 VITALS
TEMPERATURE: 97.4 F | HEIGHT: 69 IN | WEIGHT: 234 LBS | OXYGEN SATURATION: 96 % | SYSTOLIC BLOOD PRESSURE: 125 MMHG | DIASTOLIC BLOOD PRESSURE: 83 MMHG | HEART RATE: 79 BPM | BODY MASS INDEX: 34.66 KG/M2

## 2024-11-13 DIAGNOSIS — I10 ESSENTIAL (PRIMARY) HYPERTENSION: ICD-10-CM

## 2024-11-13 DIAGNOSIS — I47.10 SUPRAVENTRICULAR TACHYCARDIA, UNSPECIFIED: ICD-10-CM

## 2024-11-13 DIAGNOSIS — E78.5 HYPERLIPIDEMIA, UNSPECIFIED: ICD-10-CM

## 2024-11-13 PROCEDURE — 99214 OFFICE O/P EST MOD 30 MIN: CPT

## 2024-11-13 PROCEDURE — G2211 COMPLEX E/M VISIT ADD ON: CPT | Mod: NC

## 2024-11-13 PROCEDURE — 93000 ELECTROCARDIOGRAM COMPLETE: CPT

## 2025-01-10 ENCOUNTER — RX RENEWAL (OUTPATIENT)
Age: 75
End: 2025-01-10

## 2025-02-10 ENCOUNTER — APPOINTMENT (OUTPATIENT)
Dept: HEART AND VASCULAR | Facility: CLINIC | Age: 75
End: 2025-02-10
Payer: COMMERCIAL

## 2025-02-10 DIAGNOSIS — I10 ESSENTIAL (PRIMARY) HYPERTENSION: ICD-10-CM

## 2025-02-10 DIAGNOSIS — E78.5 HYPERLIPIDEMIA, UNSPECIFIED: ICD-10-CM

## 2025-02-10 PROCEDURE — 99213 OFFICE O/P EST LOW 20 MIN: CPT | Mod: 93

## 2025-02-10 RX ORDER — AMLODIPINE BESYLATE 2.5 MG/1
2.5 TABLET ORAL DAILY
Qty: 90 | Refills: 1 | Status: ACTIVE | COMMUNITY
Start: 2025-02-10 | End: 1900-01-01

## 2025-03-12 ENCOUNTER — APPOINTMENT (OUTPATIENT)
Dept: HEART AND VASCULAR | Facility: CLINIC | Age: 75
End: 2025-03-12
Payer: COMMERCIAL

## 2025-03-12 VITALS
DIASTOLIC BLOOD PRESSURE: 67 MMHG | HEIGHT: 69 IN | WEIGHT: 235 LBS | OXYGEN SATURATION: 96 % | TEMPERATURE: 96.3 F | SYSTOLIC BLOOD PRESSURE: 130 MMHG | HEART RATE: 60 BPM | BODY MASS INDEX: 34.8 KG/M2

## 2025-03-12 DIAGNOSIS — I10 ESSENTIAL (PRIMARY) HYPERTENSION: ICD-10-CM

## 2025-03-12 DIAGNOSIS — I47.10 SUPRAVENTRICULAR TACHYCARDIA, UNSPECIFIED: ICD-10-CM

## 2025-03-12 DIAGNOSIS — E78.5 HYPERLIPIDEMIA, UNSPECIFIED: ICD-10-CM

## 2025-03-12 PROCEDURE — 99214 OFFICE O/P EST MOD 30 MIN: CPT

## 2025-03-12 PROCEDURE — G2211 COMPLEX E/M VISIT ADD ON: CPT | Mod: NC

## 2025-03-14 ENCOUNTER — APPOINTMENT (OUTPATIENT)
Dept: HEART AND VASCULAR | Facility: CLINIC | Age: 75
End: 2025-03-14
Payer: COMMERCIAL

## 2025-03-14 DIAGNOSIS — E66.9 OBESITY, UNSPECIFIED: ICD-10-CM

## 2025-03-14 PROCEDURE — 99204 OFFICE O/P NEW MOD 45 MIN: CPT | Mod: 93

## 2025-03-14 RX ORDER — SEMAGLUTIDE 0.25 MG/.5ML
0.25 INJECTION, SOLUTION SUBCUTANEOUS
Qty: 1 | Refills: 3 | Status: ACTIVE | COMMUNITY
Start: 2025-03-14 | End: 1900-01-01

## 2025-04-19 ENCOUNTER — NON-APPOINTMENT (OUTPATIENT)
Age: 75
End: 2025-04-19

## 2025-05-01 ENCOUNTER — APPOINTMENT (OUTPATIENT)
Dept: ENDOCRINOLOGY | Facility: CLINIC | Age: 75
End: 2025-05-01

## 2025-07-09 ENCOUNTER — RX RENEWAL (OUTPATIENT)
Age: 75
End: 2025-07-09

## 2025-07-11 ENCOUNTER — APPOINTMENT (OUTPATIENT)
Dept: HEART AND VASCULAR | Facility: CLINIC | Age: 75
End: 2025-07-11

## 2025-09-03 ENCOUNTER — APPOINTMENT (OUTPATIENT)
Dept: HEART AND VASCULAR | Facility: CLINIC | Age: 75
End: 2025-09-03
Payer: COMMERCIAL

## 2025-09-03 VITALS — SYSTOLIC BLOOD PRESSURE: 120 MMHG | DIASTOLIC BLOOD PRESSURE: 76 MMHG

## 2025-09-03 VITALS
HEART RATE: 73 BPM | WEIGHT: 232 LBS | BODY MASS INDEX: 34.36 KG/M2 | TEMPERATURE: 98.2 F | DIASTOLIC BLOOD PRESSURE: 91 MMHG | OXYGEN SATURATION: 95 % | SYSTOLIC BLOOD PRESSURE: 136 MMHG | HEIGHT: 69 IN

## 2025-09-03 DIAGNOSIS — I47.10 SUPRAVENTRICULAR TACHYCARDIA, UNSPECIFIED: ICD-10-CM

## 2025-09-03 DIAGNOSIS — I45.10 UNSPECIFIED RIGHT BUNDLE-BRANCH BLOCK: ICD-10-CM

## 2025-09-03 DIAGNOSIS — I10 ESSENTIAL (PRIMARY) HYPERTENSION: ICD-10-CM

## 2025-09-03 DIAGNOSIS — E78.5 HYPERLIPIDEMIA, UNSPECIFIED: ICD-10-CM

## 2025-09-03 PROCEDURE — 93000 ELECTROCARDIOGRAM COMPLETE: CPT

## 2025-09-03 PROCEDURE — 99214 OFFICE O/P EST MOD 30 MIN: CPT

## 2025-09-03 PROCEDURE — G2211 COMPLEX E/M VISIT ADD ON: CPT | Mod: NC

## 2025-09-16 ENCOUNTER — APPOINTMENT (OUTPATIENT)
Dept: CARDIOLOGY | Facility: CLINIC | Age: 75
End: 2025-09-16
Payer: COMMERCIAL

## 2025-09-16 DIAGNOSIS — I35.1 NONRHEUMATIC AORTIC (VALVE) INSUFFICIENCY: ICD-10-CM

## 2025-09-16 PROCEDURE — 93306 TTE W/DOPPLER COMPLETE: CPT
